# Patient Record
Sex: FEMALE | Race: WHITE | Employment: OTHER | URBAN - METROPOLITAN AREA
[De-identification: names, ages, dates, MRNs, and addresses within clinical notes are randomized per-mention and may not be internally consistent; named-entity substitution may affect disease eponyms.]

---

## 2018-06-06 ENCOUNTER — RECORDS - HEALTHEAST (OUTPATIENT)
Dept: LAB | Facility: CLINIC | Age: 83
End: 2018-06-06

## 2018-06-06 LAB
ANION GAP SERPL CALCULATED.3IONS-SCNC: 6 MMOL/L (ref 5–18)
BUN SERPL-MCNC: 21 MG/DL (ref 8–28)
CALCIUM SERPL-MCNC: 9.2 MG/DL (ref 8.5–10.5)
CHLORIDE BLD-SCNC: 113 MMOL/L (ref 98–107)
CO2 SERPL-SCNC: 22 MMOL/L (ref 22–31)
CREAT SERPL-MCNC: 1.05 MG/DL (ref 0.6–1.1)
ERYTHROCYTE [DISTWIDTH] IN BLOOD BY AUTOMATED COUNT: 13.4 % (ref 11–14.5)
GFR SERPL CREATININE-BSD FRML MDRD: 49 ML/MIN/1.73M2
GLUCOSE BLD-MCNC: 118 MG/DL (ref 70–125)
HCT VFR BLD AUTO: 37.2 % (ref 35–47)
HGB BLD-MCNC: 11.5 G/DL (ref 12–16)
MCH RBC QN AUTO: 30.9 PG (ref 27–34)
MCHC RBC AUTO-ENTMCNC: 30.9 G/DL (ref 32–36)
MCV RBC AUTO: 100 FL (ref 80–100)
PLATELET # BLD AUTO: 248 THOU/UL (ref 140–440)
PMV BLD AUTO: 9.5 FL (ref 8.5–12.5)
POTASSIUM BLD-SCNC: 3.7 MMOL/L (ref 3.5–5)
RBC # BLD AUTO: 3.72 MILL/UL (ref 3.8–5.4)
SODIUM SERPL-SCNC: 141 MMOL/L (ref 136–145)
WBC: 5.3 THOU/UL (ref 4–11)

## 2018-09-04 ENCOUNTER — APPOINTMENT (OUTPATIENT)
Dept: ULTRASOUND IMAGING | Facility: CLINIC | Age: 83
DRG: 394 | End: 2018-09-04
Attending: EMERGENCY MEDICINE
Payer: MEDICARE

## 2018-09-04 ENCOUNTER — HOSPITAL ENCOUNTER (INPATIENT)
Facility: CLINIC | Age: 83
LOS: 5 days | Discharge: NURSING, REHAB OR LONG-TERM CARE | DRG: 394 | End: 2018-09-10
Attending: EMERGENCY MEDICINE | Admitting: HOSPITALIST
Payer: MEDICARE

## 2018-09-04 DIAGNOSIS — N30.00 ACUTE CYSTITIS WITHOUT HEMATURIA: Primary | ICD-10-CM

## 2018-09-04 DIAGNOSIS — R19.7 DIARRHEA, UNSPECIFIED TYPE: ICD-10-CM

## 2018-09-04 DIAGNOSIS — A04.72 CLOSTRIDIUM DIFFICILE DIARRHEA: ICD-10-CM

## 2018-09-04 DIAGNOSIS — N17.9 ACUTE KIDNEY INJURY (H): ICD-10-CM

## 2018-09-04 LAB
ALBUMIN SERPL-MCNC: 3.9 G/DL (ref 3.4–5)
ALBUMIN UR-MCNC: 100 MG/DL
ALP SERPL-CCNC: 122 U/L (ref 40–150)
ALT SERPL W P-5'-P-CCNC: 17 U/L (ref 0–50)
ANION GAP SERPL CALCULATED.3IONS-SCNC: 16 MMOL/L (ref 3–14)
ANION GAP SERPL CALCULATED.3IONS-SCNC: 18 MMOL/L (ref 3–14)
APPEARANCE UR: ABNORMAL
AST SERPL W P-5'-P-CCNC: 20 U/L (ref 0–45)
BACTERIA #/AREA URNS HPF: ABNORMAL /HPF
BASOPHILS # BLD AUTO: 0 10E9/L (ref 0–0.2)
BASOPHILS NFR BLD AUTO: 0.3 %
BILIRUB SERPL-MCNC: 0.4 MG/DL (ref 0.2–1.3)
BILIRUB UR QL STRIP: NEGATIVE
BUN SERPL-MCNC: 88 MG/DL (ref 7–30)
BUN SERPL-MCNC: 92 MG/DL (ref 7–30)
C DIFF TOX B STL QL: NEGATIVE
CALCIUM SERPL-MCNC: 7.1 MG/DL (ref 8.5–10.1)
CALCIUM SERPL-MCNC: 8 MG/DL (ref 8.5–10.1)
CHLORIDE SERPL-SCNC: 101 MMOL/L (ref 94–109)
CHLORIDE SERPL-SCNC: 93 MMOL/L (ref 94–109)
CHLORIDE UR-SCNC: 21 MMOL/L
CO2 SERPL-SCNC: 16 MMOL/L (ref 20–32)
CO2 SERPL-SCNC: 16 MMOL/L (ref 20–32)
COLOR UR AUTO: YELLOW
CREAT SERPL-MCNC: 3.4 MG/DL (ref 0.52–1.04)
CREAT SERPL-MCNC: 3.98 MG/DL (ref 0.52–1.04)
DIFFERENTIAL METHOD BLD: NORMAL
EOSINOPHIL # BLD AUTO: 0 10E9/L (ref 0–0.7)
EOSINOPHIL NFR BLD AUTO: 0.3 %
ERYTHROCYTE [DISTWIDTH] IN BLOOD BY AUTOMATED COUNT: 13.6 % (ref 10–15)
GFR SERPL CREATININE-BSD FRML MDRD: 10 ML/MIN/1.7M2
GFR SERPL CREATININE-BSD FRML MDRD: 13 ML/MIN/1.7M2
GLUCOSE SERPL-MCNC: 88 MG/DL (ref 70–99)
GLUCOSE SERPL-MCNC: 93 MG/DL (ref 70–99)
GLUCOSE UR STRIP-MCNC: NEGATIVE MG/DL
HCT VFR BLD AUTO: 40.6 % (ref 35–47)
HGB BLD-MCNC: 14 G/DL (ref 11.7–15.7)
HGB UR QL STRIP: ABNORMAL
IMM GRANULOCYTES # BLD: 0 10E9/L (ref 0–0.4)
IMM GRANULOCYTES NFR BLD: 0.3 %
KETONES UR STRIP-MCNC: 10 MG/DL
LACTATE BLD-SCNC: 1.2 MMOL/L (ref 0.7–2)
LEUKOCYTE ESTERASE UR QL STRIP: ABNORMAL
LYMPHOCYTES # BLD AUTO: 1.5 10E9/L (ref 0.8–5.3)
LYMPHOCYTES NFR BLD AUTO: 24.5 %
MCH RBC QN AUTO: 31.2 PG (ref 26.5–33)
MCHC RBC AUTO-ENTMCNC: 34.5 G/DL (ref 31.5–36.5)
MCV RBC AUTO: 90 FL (ref 78–100)
MONOCYTES # BLD AUTO: 0.5 10E9/L (ref 0–1.3)
MONOCYTES NFR BLD AUTO: 8 %
MUCOUS THREADS #/AREA URNS LPF: PRESENT /LPF
NEUTROPHILS # BLD AUTO: 4 10E9/L (ref 1.6–8.3)
NEUTROPHILS NFR BLD AUTO: 66.6 %
NITRATE UR QL: POSITIVE
NRBC # BLD AUTO: 0 10*3/UL
NRBC BLD AUTO-RTO: 0 /100
OSMOLALITY SERPL: 297 MMOL/KG (ref 280–301)
OSMOLALITY UR: 327 MMOL/KG (ref 100–1200)
PH UR STRIP: 5.5 PH (ref 5–7)
PLATELET # BLD AUTO: 229 10E9/L (ref 150–450)
POTASSIUM SERPL-SCNC: 2.9 MMOL/L (ref 3.4–5.3)
POTASSIUM SERPL-SCNC: 3.2 MMOL/L (ref 3.4–5.3)
POTASSIUM UR-SCNC: 14 MMOL/L
PROT SERPL-MCNC: 7.9 G/DL (ref 6.8–8.8)
RBC # BLD AUTO: 4.49 10E12/L (ref 3.8–5.2)
RBC #/AREA URNS AUTO: 38 /HPF (ref 0–2)
SODIUM SERPL-SCNC: 127 MMOL/L (ref 133–144)
SODIUM SERPL-SCNC: 132 MMOL/L (ref 133–144)
SODIUM UR-SCNC: 29 MMOL/L
SOURCE: ABNORMAL
SP GR UR STRIP: >1.035 (ref 1–1.03)
SPECIMEN SOURCE: NORMAL
SQUAMOUS #/AREA URNS AUTO: 28 /HPF (ref 0–1)
UROBILINOGEN UR STRIP-MCNC: 2 MG/DL (ref 0–2)
WBC # BLD AUTO: 6 10E9/L (ref 4–11)
WBC #/AREA URNS AUTO: 2907 /HPF (ref 0–5)
WBC CLUMPS #/AREA URNS HPF: PRESENT /HPF

## 2018-09-04 PROCEDURE — 96366 THER/PROPH/DIAG IV INF ADDON: CPT | Performed by: EMERGENCY MEDICINE

## 2018-09-04 PROCEDURE — 99285 EMERGENCY DEPT VISIT HI MDM: CPT | Mod: Z6 | Performed by: EMERGENCY MEDICINE

## 2018-09-04 PROCEDURE — 36415 COLL VENOUS BLD VENIPUNCTURE: CPT | Performed by: EMERGENCY MEDICINE

## 2018-09-04 PROCEDURE — 87493 C DIFF AMPLIFIED PROBE: CPT | Performed by: EMERGENCY MEDICINE

## 2018-09-04 PROCEDURE — 25000132 ZZH RX MED GY IP 250 OP 250 PS 637: Mod: GY | Performed by: HOSPITALIST

## 2018-09-04 PROCEDURE — 85025 COMPLETE CBC W/AUTO DIFF WBC: CPT | Performed by: EMERGENCY MEDICINE

## 2018-09-04 PROCEDURE — 99223 1ST HOSP IP/OBS HIGH 75: CPT | Mod: AI | Performed by: HOSPITALIST

## 2018-09-04 PROCEDURE — 25000128 H RX IP 250 OP 636: Performed by: STUDENT IN AN ORGANIZED HEALTH CARE EDUCATION/TRAINING PROGRAM

## 2018-09-04 PROCEDURE — A9270 NON-COVERED ITEM OR SERVICE: HCPCS | Mod: GY | Performed by: HOSPITALIST

## 2018-09-04 PROCEDURE — 87086 URINE CULTURE/COLONY COUNT: CPT | Performed by: HOSPITALIST

## 2018-09-04 PROCEDURE — 87040 BLOOD CULTURE FOR BACTERIA: CPT | Performed by: EMERGENCY MEDICINE

## 2018-09-04 PROCEDURE — 80053 COMPREHEN METABOLIC PANEL: CPT | Performed by: EMERGENCY MEDICINE

## 2018-09-04 PROCEDURE — 96361 HYDRATE IV INFUSION ADD-ON: CPT | Performed by: EMERGENCY MEDICINE

## 2018-09-04 PROCEDURE — 83930 ASSAY OF BLOOD OSMOLALITY: CPT | Performed by: EMERGENCY MEDICINE

## 2018-09-04 PROCEDURE — 83605 ASSAY OF LACTIC ACID: CPT | Performed by: EMERGENCY MEDICINE

## 2018-09-04 PROCEDURE — 76770 US EXAM ABDO BACK WALL COMP: CPT

## 2018-09-04 PROCEDURE — 83935 ASSAY OF URINE OSMOLALITY: CPT | Performed by: EMERGENCY MEDICINE

## 2018-09-04 PROCEDURE — 25000128 H RX IP 250 OP 636: Performed by: EMERGENCY MEDICINE

## 2018-09-04 PROCEDURE — 99285 EMERGENCY DEPT VISIT HI MDM: CPT | Mod: 25 | Performed by: EMERGENCY MEDICINE

## 2018-09-04 PROCEDURE — 82436 ASSAY OF URINE CHLORIDE: CPT | Performed by: EMERGENCY MEDICINE

## 2018-09-04 PROCEDURE — 80048 BASIC METABOLIC PNL TOTAL CA: CPT | Performed by: EMERGENCY MEDICINE

## 2018-09-04 PROCEDURE — 81003 URINALYSIS AUTO W/O SCOPE: CPT | Performed by: EMERGENCY MEDICINE

## 2018-09-04 PROCEDURE — 84300 ASSAY OF URINE SODIUM: CPT | Performed by: EMERGENCY MEDICINE

## 2018-09-04 PROCEDURE — 96365 THER/PROPH/DIAG IV INF INIT: CPT | Performed by: EMERGENCY MEDICINE

## 2018-09-04 PROCEDURE — 84133 ASSAY OF URINE POTASSIUM: CPT | Performed by: EMERGENCY MEDICINE

## 2018-09-04 PROCEDURE — 96367 TX/PROPH/DG ADDL SEQ IV INF: CPT | Performed by: EMERGENCY MEDICINE

## 2018-09-04 RX ORDER — SODIUM CHLORIDE 9 MG/ML
INJECTION, SOLUTION INTRAVENOUS CONTINUOUS
Status: DISCONTINUED | OUTPATIENT
Start: 2018-09-04 | End: 2018-09-04

## 2018-09-04 RX ORDER — SODIUM CHLORIDE AND POTASSIUM CHLORIDE 150; 900 MG/100ML; MG/100ML
INJECTION, SOLUTION INTRAVENOUS CONTINUOUS
Status: DISCONTINUED | OUTPATIENT
Start: 2018-09-04 | End: 2018-09-05

## 2018-09-04 RX ORDER — POTASSIUM CHLORIDE 750 MG/1
30 TABLET, EXTENDED RELEASE ORAL ONCE
Status: COMPLETED | OUTPATIENT
Start: 2018-09-04 | End: 2018-09-04

## 2018-09-04 RX ORDER — POTASSIUM CHLORIDE 7.45 MG/ML
10 INJECTION INTRAVENOUS
Status: DISPENSED | OUTPATIENT
Start: 2018-09-05 | End: 2018-09-05

## 2018-09-04 RX ORDER — ASPIRIN 81 MG/1
81 TABLET ORAL DAILY
COMMUNITY

## 2018-09-04 RX ORDER — POTASSIUM CL/LIDO/0.9 % NACL 10MEQ/0.1L
10 INTRAVENOUS SOLUTION, PIGGYBACK (ML) INTRAVENOUS
Status: DISCONTINUED | OUTPATIENT
Start: 2018-09-05 | End: 2018-09-04 | Stop reason: ALTCHOICE

## 2018-09-04 RX ORDER — TRIAMCINOLONE ACETONIDE 1 MG/G
CREAM TOPICAL 2 TIMES DAILY
COMMUNITY

## 2018-09-04 RX ORDER — SODIUM CHLORIDE 9 MG/ML
1000 INJECTION, SOLUTION INTRAVENOUS CONTINUOUS
Status: DISCONTINUED | OUTPATIENT
Start: 2018-09-04 | End: 2018-09-05

## 2018-09-04 RX ORDER — METOPROLOL SUCCINATE 100 MG/1
100 TABLET, EXTENDED RELEASE ORAL DAILY
COMMUNITY

## 2018-09-04 RX ORDER — ACETAMINOPHEN 500 MG
1000 TABLET ORAL EVERY 8 HOURS PRN
COMMUNITY

## 2018-09-04 RX ORDER — IBUPROFEN 800 MG
1 TABLET ORAL DAILY
COMMUNITY

## 2018-09-04 RX ORDER — LOPERAMIDE HCL 2 MG
2 CAPSULE ORAL 3 TIMES DAILY PRN
Status: ON HOLD | COMMUNITY
End: 2018-09-07

## 2018-09-04 RX ADMIN — SODIUM CHLORIDE 1000 ML: 9 INJECTION, SOLUTION INTRAVENOUS at 19:51

## 2018-09-04 RX ADMIN — METRONIDAZOLE 500 MG: 500 INJECTION, SOLUTION INTRAVENOUS at 19:50

## 2018-09-04 RX ADMIN — SODIUM CHLORIDE: 9 INJECTION, SOLUTION INTRAVENOUS at 17:31

## 2018-09-04 RX ADMIN — POTASSIUM CHLORIDE 30 MEQ: 750 TABLET, EXTENDED RELEASE ORAL at 23:49

## 2018-09-04 RX ADMIN — SODIUM CHLORIDE 1000 ML: 900 INJECTION, SOLUTION INTRAVENOUS at 18:33

## 2018-09-04 RX ADMIN — POTASSIUM CHLORIDE AND SODIUM CHLORIDE: 900; 150 INJECTION, SOLUTION INTRAVENOUS at 23:49

## 2018-09-04 ASSESSMENT — ENCOUNTER SYMPTOMS
FEVER: 0
APPETITE CHANGE: 1
NAUSEA: 1
DIARRHEA: 1

## 2018-09-04 NOTE — IP AVS SNAPSHOT
Reena Medellin #[id number] (CSN:[id number])  (89 year old F)  (Adm: 18)     [id number]               [address] [place] [address] BANK: [phone]            Patient Demographics     Patient Name Sex          Age SSN Address Phone    Reena Medellin Female 1921 (89 year old) [id number] [place] [address] [phone] (Home)      Emergency Contact(s)     Name Relation Home Work Mobile    Maite Medellin  [phone]      Admission Information     Attending Provider Admitting Provider Admission Type Admission Date/Time    Delicia Pettit MD Khambaty, Maleka, MD Emergency 18  1647    Discharge Date Hospital Service Auth/Cert Status Service Area     Internal Medicine [facility]    Unit Room/Bed Admission Status        U 5201/5201-01 Admission (Confirmed)       Admission     Complaint    Diarrhea      Hospital Account     Name Acct ID Class Status Primary Coverage    Reena Medellin [id number] Inpatient Open MEDICARE - MEDICARE FOR HB SUPPLEMENT            Guarantor Account (for Hospital Account #[id number])     Name Relation to Pt Service Area Active? Acct Type    Reena Medellin Self FCS Yes Personal/Family    Address Phone          [address] [place] [address] [phone](H)              Coverage Information (for Hospital Account #[id number])     1. MEDICARE/MEDICARE FOR HB SUPPLEMENT     F/O Payor/Plan Precert #    MEDICARE/MEDICARE FOR HB SUPPLEMENT     Subscriber Subscriber #    Reena Medellin [id number]    Address Phone    ATTN CLAIMS  [address]  [place] [address] [phone]          2. [employer]EDOM PLAN     F/O Payor/Plan Precert #    [employer] FREEDOM PLAN     Subscriber Subscriber #    Reena Medellin [id number]    Address Phone    [address]  [place] [address] [phone]                                                      INTERAGENCY  TRANSFER FORM - PHYSICIAN ORDERS   9/4/2018                       [facility] BANK: [phone]            Attending Provider: Delicia Pettit MD     Allergies:  Sulfa Drugs    Infection:  None   Service:  INTERNAL MED    Ht:  --   Wt:  68 kg (150 lb)   Admission Wt:  68 kg (150 lb)    BMI:  --   BSA:  --            ED Clinical Impression     Diagnosis Description Comment Added By Time Added    Clostridium difficile diarrhea [A04.72] Clostridium difficile diarrhea [A04.72] Suspected Bryce Jackson,  9/4/2018  6:29 PM    Diarrhea, unspecified type [R19.7] Diarrhea, unspecified type [R19.7]  Bryce Jackson,  9/4/2018  6:31 PM    Acute kidney injury (H) [N17.9] Acute kidney injury (H) [N17.9]  Bryce Jackson,  9/4/2018  6:31 PM      Hospital Problems as of 9/10/2018              Priority Class Noted POA    Diarrhea Medium  9/5/2018 Yes      Non-Hospital Problems as of 9/10/2018     None      Code Status History     Date Active Date Inactive Code Status Order ID Comments User Context    9/7/2018  9:29 AM  DNR/DNI [id number]  Myra Valle MD Outpatient    9/5/2018  2:14 PM 9/7/2018  9:29 AM DNR/DNI [id number]  Myra Valle MD Inpatient    9/5/2018  1:34 AM 9/5/2018  2:14 PM Full Code [id number]  Karli Ryan Inpatient      Current Code Status     Date Active Code Status Order ID Comments User Context       Prior      Summary of Visit     Reason for your hospital stay       You were admitted to the hospital a urinary tract infection and diarrhea. You were treated with IV antibiotics for the urinary tract infection. Your dose of loperamide was also increased to help with your diarrhea, which is likely due to your recent use of antibiotics.                Medication Review      START taking        Dose / Directions Comments    * bismuth subsalicylate 262 MG Tabs   Used for:  Diarrhea, unspecified type        Dose:  3 tablet   Take 3 tablets by mouth  3 times daily   Quantity:  270 tablet   Refills:  1        * bismuth subsalicylate 262 MG chewable tablet   Commonly known as:  PEPTO BISMOL   Used for:  Diarrhea, unspecified type        Dose:  524 mg   Take 2 tablets (524 mg) by mouth 4 times daily (before meals and nightly)   Quantity:  56 tablet   Refills:  1        * Notice:  This list has 2 medication(s) that are the same as other medications prescribed for you. Read the directions carefully, and ask your doctor or other care provider to review them with you.      CONTINUE these medications which may have CHANGED, or have new prescriptions. If we are uncertain of the size of tablets/capsules you have at home, strength may be listed as something that might have changed.        Dose / Directions Comments    loperamide 2 MG capsule   Commonly known as:  IMODIUM   This may have changed:    - when to take this  - reasons to take this   Used for:  Diarrhea, unspecified type        Dose:  2 mg   Take 1 capsule (2 mg) by mouth 2 times daily as needed for diarrhea (titrate to 1-2 stools daily)   Quantity:  30 capsule   Refills:  1          CONTINUE these medications which have NOT CHANGED        Dose / Directions Comments    acetaminophen 500 MG tablet   Commonly known as:  TYLENOL        Dose:  1000 mg   Take 1,000 mg by mouth every 8 hours as needed for mild pain   Refills:  0        aspirin 81 MG EC tablet        Dose:  81 mg   Take 81 mg by mouth daily   Refills:  0        CALAZIME SKIN PROTECTANT EX        Dose:  1 g   Externally apply 1 g topically as needed   Refills:  0        melatonin 5 MG tablet        Dose:  5 mg   Take 5 mg by mouth nightly as needed for sleep   Refills:  0        metoprolol succinate 100 MG 24 hr tablet   Commonly known as:  TOPROL-XL        Dose:  100 mg   Take 100 mg by mouth daily   Refills:  0        MULTIVITAMIN ADULT PO        Dose:  1 tablet   Take 1 tablet by mouth daily   Refills:  0        triamcinolone 0.1 % cream   Commonly  known as:  KENALOG        Apply topically 2 times daily TO AFFECTED AREAS OF LEG FOR 3-4 WEEKS FOR ATOPIC DERMATITIS   Refills:  0        Vitamin D3 400 units Caps        Dose:  1 capsule   Take 1 capsule by mouth daily   Refills:  0                After Care     Activity - Up ad coco           Advance Diet as Tolerated       Follow this diet upon discharge: Regular       Encourage PO fluids           General info for SNF       Length of Stay Estimate: Long Term Care  Condition at Discharge: Improving  Level of care:board and care  Rehabilitation Potential: Good  Admission H&P remains valid and up-to-date: Yes  Recent Chemotherapy: N/A  Use Nursing Home Standing Orders: Yes       Mantoux instructions       Give two-step Mantoux (PPD) Per Facility Policy Yes             Referrals     Home Care OT Referral for Hospital Discharge       [facility]  Ph: [phone]  Fax: [phone]    OT to eval and treat    Your provider has ordered home care - occupational therapy. If you have not been contacted within 2 days of your discharge please call the department phone number listed on the top of this document.       Home Care PT Referral for Hospital Discharge       [facility]  Ph: [phone]  Fax: [phone]    PT to eval and treat    Your provider has ordered home care - physical therapy. If you have not been contacted within 2 days of your discharge please call the department phone number listed on the top of this document.       Home care nursing referral       [facility]  Ph: [phone]  Fax: [phone]    RN skilled nursing visit. RN to assess vital signs and weight, respiratory and cardiac status, pain level and activity tolerance, hydration, nutrition and bowel status and home safety.  RN to teach medication management.  Home health aide to assist with ADLS    Your provider has ordered home care nursing services. If you have not been contacted within 2 days of your discharge please call  the inpatient department phone number at [phone] .       Physical Therapy Adult Consult       Evaluate and treat as clinically indicated.    Reason:  Deconditioning in the setting of ongoing urinary tract infection             Follow-Up Appointment Instructions     Follow Up and recommended labs and tests       - Follow up with primary care provider early next week to ensure adequate treatment of cystitis and diarrhea. The following labs/tests are recommended: Basic metabolic panel.             Statement of Approval     Ordered          09/10/18 1006  I have reviewed and agree with all the recommendations and orders detailed in this document.  EFFECTIVE NOW     Approved and electronically signed by:  Delicia Pettit MD                                                 INTERAGENCY TRANSFER FORM - NURSING   9/4/2018                       [address] [facility] BANK: [phone]            Attending Provider: Delicia Pettit MD     Allergies:  Sulfa Drugs    Infection:  None   Service:  INTERNAL MED    Ht:  --   Wt:  68 kg (150 lb)   Admission Wt:  68 kg (150 lb)    BMI:  --   BSA:  --            Advance Directives        Scanned docmt in ACP Activity?           No scanned doc        Immunizations     None      ASSESSMENT     Discharge Profile Flowsheet     EXPECTED DISCHARGE     SKIN      Expected Discharge Date  09/10/18 (G[facility]ERTO) 09/10/18 0846   Inspection of bony prominences  Full 09/10/18 1054    DISCHARGE NEEDS ASSESSMENT     Full except areas not inspected   Hip, left;Hip, right;Buttock, left;Buttock, right;Sacrum;Coccyx;Knee, left;Knee, right;Ankle, left;Ankle, right;Heel, left;Heel, right;Foot, left;Foot, right 09/07/18 0525    Equipment Currently Used at Home  grab bar;raised toilet;shower chair;walker, rolling 09/07/18 1618   Inspection under devices  Full 09/10/18 1054    Transportation Available  family or friend will provide 09/07/18 1618   Skin WDL  ex 09/10/18 1054     "GASTROINTESTINAL (ADULT,PEDIATRIC,OB)     Skin Color/Characteristics  bruised (ecchymotic) 09/10/18 1054    GI WDL  ex 09/10/18 1054   Skin Temperature  warm 09/10/18 1054    Last Bowel Movement  09/09/18 09/09/18 2038   Skin Moisture  dry 09/10/18 1054    Passing flatus  yes 09/09/18 1102   Skin Elasticity  quick return to original state 09/09/18 1102    COMMUNICATION ASSESSMENT     Skin Integrity  bruise(s);scab(s);scar(s);drain/device(s) 09/10/18 1054    Patient's communication style  spoken language (English or Bilingual) 09/05/18 0126   SAFETY      FINAL RESOURCES     Safety WDL  WDL 09/10/18 1054    Resources List  Home Care 09/05/18 1109   All Alarms  alarm(s) activated and audible 09/09/18 2038    Home Care  Other (Comment) ([facility]) 09/05/18 1109                      Assessment WDL (Within Defined Limits) Definitions           Safety WDL     Effective: 09/28/15    Row Information: <b>WDL Definition:</b> Bed in low position, wheels locked; call light in reach; upper side rails up x 2; ID band on<br> <font color=\"gray\"><i>Item=AS safety wdl>>List=AS safety wdl>>Version=F14</i></font>      Skin WDL     Effective: 09/28/15    Row Information: <b>WDL Definition:</b> Warm; dry; intact; elastic; without discoloration; pressure points without redness<br> <font color=\"gray\"><i>Item=AS skin wdl>>List=AS skin wdl>>Version=F14</i></font>      Vitals     Vital Signs Flowsheet     VITAL SIGNS     Functioning  can do everything I need to 09/07/18 0805    Temp  98.3  F (36.8  C) 09/10/18 0440   Sleep  normal sleep 09/07/18 0805    Temp src  Oral 09/10/18 0440   HEIGHT AND WEIGHT      Resp  16 09/10/18 0900   Weight  68 kg (150 lb) 09/05/18 0427    Pulse  81 09/10/18 0900   Weight Method  Standing scale 09/05/18 0427    Pulse/Heart Rate Source  Monitor 09/10/18 0900   GIL[facility]ERTO COMA SCALE      BP  167/81 09/10/18 0900   Best Eye Response  4-->(E4) spontaneous 09/10/18 1054    BP Location  Right arm 09/10/18 0900   " Best Motor Response  6-->(M6) obeys commands 09/10/18 1054    OXYGEN THERAPY     Best Verbal Response  5-->(V5) oriented 09/10/18 1054    SpO2  100 % 09/10/18 0900   [place] Coma Scale Score  15 09/10/18 1054    O2 Device  None (Room air) 09/10/18 0900   POSITIONING      PAIN/COMFORT     Body Position  independently positioning 09/09/18 2038    Patient Currently in Pain  denies 09/10/18 0914   Head of Bed (HOB)  HOB at 30-45 degrees 09/09/18 1102    Preferred Pain Scale  CAPA (Clinically Aligned Pain Assessment) ([facility] Adults Only) 09/10/18 0914   DAILY CARE      CLINICALLY ALIGNED PAIN ASSESSMENT (CAPA) ([facility] ADULTS ONLY)     Activity Management  activity adjusted per tolerance 09/09/18 2038    Comfort  negligible pain 09/07/18 0805   Activity Assistance Provided  assistance, 1 person 09/09/18 2038    Change in Pain  about the same 09/07/18 0805   Assistive Device Utilized  walker 09/09/18 2038    Pain Control  partially effective 09/07/18 0805                 Patient Lines/Drains/Airways Status    Active LINES/DRAINS/AIRWAYS     Name: Placement date: Placement time: Site: Days: Last dressing change:    Peripheral IV 09/07/18 Right;Anterior Lower forearm 09/07/18   0825   Lower forearm   3             Patient Lines/Drains/Airways Status    Active PICC/CVC     None            Intake/Output Detail Report     Date Intake     Output     Net    Shift P.O. I.V. IV Piggyback Total Urine Other Stool Total       Day 09/09/18 0000 - 09/09/18 0659 -- -- -- -- 450 -- -- 450 -450    Colette 09/09/18 0700 - 09/09/18 1459 300 -- -- 300 400 -- -- 400 -100    Noc 09/09/18 1500 - 09/09/18 2359 480 -- -- 480 250 200 -- 450 30    Day 09/10/18 0000 - 09/10/18 0659 -- -- -- -- 100 -- -- 100 -100    Colette 09/10/18 0700 - 09/10/18 1459 -- -- -- -- 100 -- -- 100 -100      Last Void/BM       Most Recent Value    Urine Occurrence 1 at 09/09/2018 0700    Stool Occurrence 1 at 09/09/2018 0700      Case  Management/Discharge Planning     Case Management/Discharge Planning Flowsheet     LIVING ENVIRONMENT     Home Care  Other (Comment) ([facility]) 09/05/18 1109    Lives With  facility resident 09/07/18 1618   MH/BH CAREGIVER      Living Arrangements  assisted living 09/07/18 1618   Filed Complexity Screen Score  5 09/05/18 0827    COPING/STRESS     ABUSE RISK SCREEN      Major Change/Loss/Stressor  denies 09/05/18 0132   QUESTION TO PATIENT:  Has a member of your family or a partner(now or in the past) intimidated, hurt, manipulated, or controlled you in any way?  no 09/05/18 0130    EXPECTED DISCHARGE     QUESTION TO PATIENT: Do you feel safe going back to the place where you are living?  yes 09/05/18 0130    Expected Discharge Date  09/10/18 ([facility]) 09/10/18 0846   OBSERVATION: Is there reason to believe there has been maltreatment of a vulnerable adult (ie. Physical/Sexual/Emotional abuse, self neglect, lack of adequate food, shelter, medical care, or financial exploitation)?  no 09/05/18 0130    DISCHARGE PLANNING     OTHER      Transportation Available  family or friend will provide 09/07/18 1618   Are you depressed or being treated for depression?  No 09/05/18 0132    FINAL RESOURCES     HOMICIDE RISK      Equipment Currently Used at Home  grab bar;raised toilet;shower chair;walker, rolling 09/07/18 1618   Feels Like Hurting Others  no 09/04/18 1730    Resources List  Home Care 09/05/18 1109                       [address] [facility] BANK: [phone]            Medication Administration Report for Reena Medellin as of 09/10/18 1120   Legend:    Given Hold Not Given Due Canceled Entry Other Actions    Time Time (Time) Time  Time-Action       Inactive    Active    Linked        Medications 09/04/18 09/05/18 09/06/18 09/07/18 09/08/18 09/09/18 09/10/18    acetaminophen (TYLENOL) tablet 1,000 mg  Dose: 1,000 mg  Freq: EVERY 8 HOURS PRN Route: PO  PRN Reason: mild pain  Start: 09/05/18 0144   Admin Instructions:  Maximum acetaminophen dose from all sources = 75 mg/kg/day not to exceed 4 gram    Admin. Amount: 2 tablet (2 × 500 mg tablet)  Dispense Loc: [facility] ADS 5A1               aspirin EC tablet 81 mg  Dose: 81 mg  Freq: DAILY Route: PO  Start: 09/05/18 0800   Admin Instructions: DO NOT CRUSH.    Admin. Amount: 1 tablet (1 × 81 mg tablet)  Last Admin: 09/10/18 0848  Dispense Loc: [facility] ADS 5A1      0754 (81 mg)-Given        0958 (81 mg)-Given        0804 (81 mg)-Given        0839 (81 mg)-Given        1007 (81 mg)-Given        0848 (81 mg)-Given           bismuth subsalicylate (PEPTO BISMOL) chewable tablet 524 mg  Dose: 524 mg  Freq: 4 TIMES DAILY BEFORE MEALS & NIGHTLY Route: PO  Start: 09/07/18 0900   Admin. Amount: 2 tablet (2 × 262 mg tablet)  Last Admin: 09/08/18 1655  Dispense Loc: [facility]        (1112)-Not Given       1342 (524 mg)-Given       (1801)-Not Given       (2100)-Not Given        (0842)-Not Given       (1055)-Not Given       1655 (524 mg)-Given       (2134)-Not Given        (0800)-Not Given       (1006)-Not Given       (1633)-Not Given       (2130)-Not Given        (0849)-Not Given       (1035)-Not Given       [ ] 1630       [ ] 2200           cholecalciferol (vitamin D3) tablet 400 Units  Dose: 400 Units  Freq: DAILY Route: PO  Start: 09/05/18 0800   Admin. Amount: 1 tablet (1 × 400 Units tablet)  Last Admin: 09/10/18 0848  Dispense Loc: [facility] ADS 5A1      1240 (400 Units)-Given        0958 (400 Units)-Given        0803 (400 Units)-Given        0838 (400 Units)-Given        1007 (400 Units)-Given        0848 (400 Units)-Given           loperamide (IMODIUM) capsule 2 mg  Dose: 2 mg  Freq: 3 TIMES DAILY PRN Route: PO  PRN Reason: diarrhea  Start: 09/05/18 0145   Admin. Amount: 1 capsule (1 × 2 mg capsule)  Last Admin: 09/06/18 2305  Dispense Loc: [facility] ADS 5A1       2305 (2 mg)-Given               loperamide (IMODIUM) capsule 4 mg  Dose: 4 mg  Freq: EVERY MORNING Route: PO  Start:  09/07/18 0800   Admin. Amount: 2 capsule (2 × 2 mg capsule)  Last Admin: 09/10/18 0848  Dispense Loc: [facility] ADS 5A1        0804 (4 mg)-Given        0839 (4 mg)-Given        1007 (4 mg)-Given        0848 (4 mg)-Given           loperamide (IMODIUM) capsule 4 mg  Dose: 4 mg  Freq: EVERY EVENING Route: PO  Start: 09/06/18 2000   Admin. Amount: 2 capsule (2 × 2 mg capsule)  Last Admin: 09/08/18 1934  Dispense Loc: [facility] ADS 5A1       2007 (4 mg)-Given        (2007)-Not Given        1934 (4 mg)-Given        (2130)-Not Given        [ ] 2000           melatonin tablet 5 mg  Dose: 5 mg  Freq: AT BEDTIME PRN Route: PO  PRN Reason: sleep  Start: 09/05/18 0145   Admin. Amount: 1 tablet (1 × 5 mg tablet)  Last Admin: 09/09/18 2131  Dispense Loc: [facility] ADS 5A1          2131 (5 mg)-Given            menthol-zinc oxide (CALMOSEPTINE) 0.44-20.625 % ointment  Freq: 2 TIMES DAILY PRN Route: Top  PRN Reason: skin protection  Start: 09/06/18 1745   Admin Instructions: Apply to perianal area for skin breakdown    Dispense Loc: [facility]               naloxone (NARCAN) injection 0.1-0.4 mg  Dose: 0.1-0.4 mg  Freq: EVERY 2 MIN PRN Route: IV  PRN Reason: opioid reversal  Start: 09/05/18 0035   Admin Instructions: For respiratory rate LESS than or EQUAL to 8.  Partial reversal dose:  0.1 mg titrated q 2 minutes for Analgesia Side Effects Monitoring Sedation Level of 3 (frequently drowsy, arousable, drifts to sleep during conversation).Full reversal dose:  0.4 mg bolus for Analgesia Side Effects Monitoring Sedation Level of 4 (somnolent, minimal or no response to stimulation).  For ordered doses up to 2mg give IVP. Give each 0.4mg over 15 seconds in emergency situations. For non-emergent situations further dilute in 9mL of NS to facilitate titration of response.    Admin. Amount: 0.1-0.4 mg = 0.25-1 mL Conc: 0.4 mg/mL  Dispense Loc: [facility] ADS 5A1  Volume: 1 mL               potassium chloride SA (K-DUR/KLOR-CON M) CR tablet  20 mEq  Dose: 20 mEq  Freq: DAILY Route: PO  Start: 09/05/18 0800   Admin Instructions: DO NOT CRUSH.    Admin. Amount: 2 tablet (2 × 10 mEq tablet)  Last Admin: 09/10/18 0848  Dispense Loc: [facility] ADS 5A1      0754 (20 mEq)-Given        0958 (20 mEq)-Given        0802 (20 mEq)-Given        0840 (20 mEq)-Given        1007 (20 mEq)-Given        0848 (20 mEq)-Given          Discontinued Medications  Medications 09/04/18 09/05/18 09/06/18 09/07/18 09/08/18 09/09/18 09/10/18         Dose: 1 g  Freq: EVERY 24 HOURS Route: IV  Indications of Use: URINARY TRACT INFECTION  Indications Comment: Recurrent UTI.  Recalcitrant to Cipro, Levaquin/Amoxicillin, and Cefdinir  Last Dose: 0 g (09/07/18 0808)  Start: 09/05/18 0700   End: 09/08/18 0636   Admin Instructions: Dose adjusted per renal dosing policy.  Estimated CrCl = 10-29 mL/min. <br>    Admin. Amount: 1 g  Last Admin: 09/07/18 0906  Dispense Loc: [facility]      0854 (1 g)-New Bag        0629 (1 g)-New Bag        0803 (1 g)-New Bag       0808-Stopped [C]       0906 (1 g)-Restarted        (0631)-Not Given [C]       0636-Med Discontinued                  INTERAGENCY TRANSFER FORM - NOTES (H&P, Discharge Summary, Consults, Procedures, Therapies)   9/4/2018                       UNIT [address] [facility] BANK: [phone]               History & Physicals      H&P by Karli Ryan at 9/4/2018 10:59 PM     Author:  Karli Ryan Service:  General Medicine Author Type:  Resident    Filed:  9/5/2018  3:30 AM Date of Service:  9/4/2018 10:59 PM Creation Time:  9/4/2018  9:52 PM    Status:  Attested :  Karli Ryan (Resident)    Cosigner:  Andressa Benitez MD at 9/5/2018 11:04 AM        Attestation signed by Andressa Benitez MD at 9/5/2018 11:04 AM        Physician Attestation         I, Andressa Benitez, personally examined and evaluated this patient.  I discussed the patient with the medical student and/or resident and care team, and agree with the assessment and  plan of care as documented in the note of September 4, 2018 [date].       I personally reviewed vital signs, medications, labs and imaging.  Family history was reviewed and is not pertinent to current presentation.      Moura findings: Ms. Medellin is a 89 year old lady with prior medical history of GERD, osteoarthritis, solitary L kidney (renal cancer s/p right nephrectomy), HTN, CKD 3, intraductal papillary mucinous neoplasm presenting from [facility] with weakness, dizziness, hypotension, diarrhea and nausea.  The patient has been on multiple rounds of antibiotics, most recently Levofloxacin, for UTI's.  Given her current presentation concern was for C diff colitis.  This was obtained in the emergency room and patient was started on empiric Flagyl while in the ED.  (Addendum C diff was negative and Flagyl was not continued). Her hypotension has responded to fluid resuscitation.  She also has hyponatremia which we will workup with serum osm, urine osm, and urine sodium.  However given her clinical history this may be hypovolemic.  She has a significant MEME - we will recheck creatinine after fluid administration.  We can hold home metoprolol given her current low blood pressures.      Andressa Benitez MD      Date of Service (when I saw the patient): 09/04/18                               [facility] [place]    Internal Medicine History and Physical - [facility] Service       Date of Admission:  9/4/2018    Chief Complaint[MG1.1]   Urinary urgency, frequency, retention    History is obtained from the patient and electronic health record[MG1.2]    History of Present Illness   Reena Medellin is a 89 year old female[MG1.1] with a PMHx significant for osteoporosis, CKD, HTN, and renal cancer s/p right nephrectomy, who presents with complaints of urinary retention and frequency.    For the past 1.5 months, patient has been experiencing urinary frequency, urgency, and feeling of urinary retention.  " No associated flank pain, fevers, or dysuria.  However, has noticed some pressure in the lower abdomen.  Patient has been evaluated for these symptoms 3 times since onset.  First evaluation was on 7/13, at which point she was diagnosed with a UTI and prescribed Cipro.  Next, followed up in the ED on 8/6[MG1.2] after[MG1.3] a fall[MG1.2] at home where her \"knees gave out\"[MG1.3].  Once again noted to have a UTI, and was prescribed cefdinir.  More recently, on 8/14, started on Levaquin and amoxicillin for continued cystitis.    In addition to the aforementioned symptoms, patient has had 4-5 days of nonbloody diarrhea, which has tapered off the past 2 days.  Patient denies any nausea, vomiting, sick contacts, new foods, or recent travel.  She does note having a decreased appetite over the past few months.  Earlier today, patient presented to clinic for evaluation of her diarrhea.  She was found to be hypotensive with blood pressure around 70/40.  Out of concern for her continued urinary symptoms and diarrhea in the setting of hypotension, patient transferred to the ED for evaluation.    Currently, patient complains of fullness in her lower abdomen as well as needing to urinate despite going to the bathroom recently.  She denies any recent sexual intercourse, new perineal soaps/cleansers, or difficulties with wiping her perineum post void/BMs.       Per chart review, patient fell in early August secondary to weakness in her bilateral knees leading[MG1.2] to head trauma with a laceration to the scalp[MG1.3].  She was evaluated in the ED.  CT scan of the head and cervical spine were unremarkable.  Patient denies any recent falls since.  No current lightheadedness, vision changes, or headaches.   Patient uses a walker[MG1.2].[MG1.3]    Review of Systems[MG1.1]   The 10 point Review of Systems is negative other than noted in the HPI or here.[MG1.2]     Past Medical History[MG1.1]    Essential " HTN  Osteoporoses  Anemia  Renal Cancer[MG1.2]    Past Surgical History[MG1.1]   S/p right nephrectomy[MG1.2]    Social History[MG1.1]   Born in [facility].  Currently resides in an assisted living facility.  Quit smoking in her 40s.  Rare, social alcohol use.  No illicit drug use.[MG1.2]      Family History[MG1.1]   No family history on file.[MG1.4]    Prior to Admission Medications   Prior to Admission Medications   Prescriptions Last Dose Informant Patient Reported? Taking?   Cholecalciferol (VITAMIN D3) 400 units CAPS   Yes Yes   Sig: Take 1 capsule by mouth daily   Multiple Vitamins-Minerals (MULTIVITAMIN ADULT PO)   Yes Yes   Sig: Take 1 tablet by mouth daily   acetaminophen (TYLENOL) 500 MG tablet   Yes Yes   Sig: Take 1,000 mg by mouth every 8 hours as needed for mild pain   aspirin 81 MG EC tablet   Yes Yes   Sig: Take 81 mg by mouth daily   loperamide (IMODIUM) 2 MG capsule   Yes Yes   Sig: Take 2 mg by mouth 3 times daily as needed for diarrhea   melatonin 5 MG tablet   Yes Yes   Sig: Take 5 mg by mouth nightly as needed for sleep   metoprolol succinate (TOPROL-XL) 100 MG 24 hr tablet   Yes Yes   Sig: Take 100 mg by mouth daily   triamcinolone (KENALOG) 0.1 % cream   Yes Yes   Sig: Apply topically 2 times daily TO AFFECTED AREAS OF LEG FOR 3-4 WEEKS FOR ATOPIC DERMATITIS      Facility-Administered Medications: None     Allergies   Allergies   Allergen Reactions     Sulfa Drugs        Physical Exam   Vital Signs: Temp: 97  F (36.1  C) Temp src: Oral BP: 111/58 Pulse: 72   Resp: 18 SpO2: 97 % O2 Device: None (Room air)    Weight: 0 lbs 0 oz  General Appearance:[MG1.1] Well appearing.  Resting comfortably in bed.  No acute distress.[MG1.2]    Eyes:[MG1.1] Sclera anicteric.[MG1.2]    HEENT:[MG1.1] Crown of head, two well healed excoriations.  Slight surrounding edema but no appreciable ecchymosis.[MG1.2]    Respiratory:[MG1.1] Breathing comfortably on room air.  Lungs CTAB.[MG1.2]  Cardiovascular:[MG1.1] Heart  RRR.  No murmur, gallops, or rubs.[MG1.2]  GI:[MG1.1] Soft, non-distended.  Minimal suprapubic tenderness.  No rebound or guarding.  Normoactive BS.[MG1.2]  Lymph/Hematologic:[MG1.1] No bruising noted.[MG1.2]  Genitourinary:[MG1.1] Deferred.  400 ml PVR.[MG1.2]    Skin:[MG1.1] Small ulceration distal portion of right 4th toe.[MG1.2]    Musculoskeletal:[MG1.1] No bilateral calf tenderness.  No CVA tenderness bilaterally.[MG1.2]  Slight bilateral patellar effusion without overlying erythema, warmth, or tenderness.    Neurolo[MG1.3]gic:[MG1.1] Moving all extremities.  A&Ox3.[MG1.2]    Psychiatric:[MG1.1] Normal mood and affect.[MG1.2]    Assessment & Plan   Reena Medellin is a 89 year old female[MG1.1] with a PMHx significant for osteoporosis, CKD, HTN, and renal cancer s/p right nephrectomy, who was admitted on 9/4 for ongoing cystitis in the setting of acute kidney injury with electrolyte abnormalities.[MG1.2]       # Urinary Retention/ Urgency/ Frequency[MG1.1]  Ongoing symptoms for the past 1.5 months.  Been evaluated 3 times previously for symptoms with treatment including Cipro, Cefdinir, and more recently Levaquin/amoxicillin on 8/14.  UA today consistent with UTI with UC pending.[MG1.2]  No clinical signs of pyelonephritis:  CVA tenderness, fever, flank pain.  Was hypotensive but lactic acid wnl.[MG1.3]  Based on clinical history, suspect a chronic cystitis with potential resistant organisms versus underlying kidney stone seeding recurrent infection versus difficulties with relaxation of urethral sphincter.  Obtained US of the[MG1.2] left[MG1.3] kidney (s/p right nephrectomy 2/2 to renal cancer) to evaluate for hydronephrosis, which was negative.  Straight cath PVR was 400 ml.  May consider ashton if retention becomes driving factor.  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       - Antibiotics: Vancomycin/ Cefepime.[MG1.3]           # Acute Kidney Injury[MG1.1]  # Hypotension  # High Anion Gap Metabolic Acidosis[MG1.3]  Cr 3.98 upon presentation.  Hx of CKD.  However, baseline appears to be around 0.9-1.1.  Suspect related to hypoperfusion given recent diarrhea[MG1.2],[MG1.3] decreased oral intake[MG1.2], and hypotension at clinic[MG1.3].  Cautious regarding fluid resuscitation in the setting of hyponatremia and potential SIADH.[MG1.2]  Will give fluids judiciously.[MG1.3]          # Hyponatremia[MG1.1]  Na 127 upon presentation.  Question hypovolemic hyponatremia (diarrhea, decrease PO) versus SIADH[MG1.2] or combination of the two[MG1.3].[MG1.2]  Urine studies to further differentiate pending.[MG1.3]  Will treat with small bolus of fluids to see if sodium levels improve[MG1.2].  If improves, continue IV hydration while being cognizant of electrolyte levels.[MG1.3]    - Pending:  Urine osmolality, random sodium urine, random chloride urine[MG1.2]  - BMP AM.  Based on correction may consider Q6hour versus BID testing.[MG1.3]    # Hypokalemia[MG1.1]  Potassium of 3.2 on admission.[MG1.2]  Setting of MEME and CKD.  Replace as needed.[MG1.3]      - Potassium chloride 30 mEq[MG1.2] one time dose  - IVF with supplemental potassium chloride[MG1.3]      # Diarrhea[MG1.1]  In the setting of recent antibiotic use[MG1.2]:  Fluoroquinolones[MG1.3].  Has improved over the past two days.  C. Diff testing negative.  Question gastroenteritis although no recent sick contacts, travel, nor new foods[MG1.2] versus antibiotic-associated diarrhea.  Giv[MG1.3]en IV Flagyl in the ED.  Will evaluate for potential causes and in the meantime treat symptomatically.      - Loperamide PRN  - Enteric Panel pending[MG1.2]    # Decreased Appetite[MG1.1]  Over the past few months.  No associated nausea or vomiting.  Will place on regular diet.  If concern regarding nutrition status, may consider nutrition consult.          # Recent Fall with scalp laceration (8/6)  Evaluated  at OSH[MG1.2].[MG1.3]  CT cervical spine and head unremarkable.  No sequale post fall and has not had any other episodes since.  ? Deconditioning component in setting of advanced age.  - PT/ OT evaluation           Chronic Problems  Osteoporosis:  Continue PTA Vitamin D[MG1.2]    # Pain Assessment:[MG1.1]   Reena mitchell pain level was assessed and she currently denies pain.[MG1.2]      Diet:[MG1.1]  Regular Diet[MG1.2]  Fluids:   DVT Prophylaxis:[MG1.1] Pneumatic Compression Devices[MG1.2]  Code Status:[MG1.1] Full Code[MG1.2]    Disposition Plan   Expected discharge:[MG1.1] 2 - 3 days[MG1.2]; recommended to[MG1.1] prior living arrangement[MG1.2] once[MG1.1] antibiotic plan established[MG1.2].     Entered: Karli Ryan 09/04/2018, 9:52 PM   Information in the above section will display in the discharge planner report.    The patient was discussed with [MG1.1] Andressa Benitez[MG1.2]    Karli Ryan  [facility][MG1.1] Nights[MG1.2]  [facility]   Pager:[MG1.1] [phone][MG1.2]  Please see sticky note for cross cover information      Data[MG1.1]   Data     Recent Labs  Lab 09/04/18  2300 09/04/18  1714   WBC  --  6.0   HGB  --  14.0   MCV  --  90   PLT  --  229   * 127*   POTASSIUM 2.9* 3.2*   CHLORIDE 101 93*   CO2 16* 16*   BUN 88* 92*   CR 3.40* 3.98*   ANIONGAP 16* 18*   SABRINA 7.1* 8.0*   GLC 88 93   ALBUMIN  --  3.9   PROTTOTAL  --  7.9   BILITOTAL  --  0.4   ALKPHOS  --  122   ALT  --  17   AST  --  20     Recent Results (from the past 24 hour(s))   US Renal Complete    Narrative    PRELIMINARY REPORT - The following report is a preliminary  interpretation.      Impression    IMPRESSION:   1. Unremarkable ultrasound of the left kidney. No evidence of  hydronephrosis.  2. The right kidney is surgically absent.[MG1.2]                  Revision History        User Key Date/Time User Provider Type Action    > MG1.3 9/5/2018  3:30 AM Karli Ryan Resident Sign     MG1.4 9/5/2018  1:33 AM  Karli Ryan Resident      MG1.2 9/5/2018  1:32 AM Karli Ryan Resident      MG1.1 9/4/2018  9:52 PM Karli Ryan Resident                   Discharge Summaries     No notes of this type exist for this encounter.      Consult Notes     No notes of this type exist for this encounter.         Progress Notes - Physician (Notes for yesterday and today)      Progress Notes by Delicia Pettit MD at 9/9/2018 10:27 AM     Author:  Delicia Pettit MD Service:  General Medicine Author Type:  Physician    Filed:  9/10/2018  5:16 AM Date of Service:  9/9/2018 10:27 AM Creation Time:  9/9/2018 10:27 AM    Status:  Signed :  Delicia Pettit MD (Physician)         [facility]    Internal Medicine Progress Note - [facility] Service    Today  - Only a few stools yesterday, appears to have stabilized with scheduled loperamide and pepto bismol  - Otherwise, no new changes to the medical plan, awaiting return to [facility]    Assessment & Plan   Reena Medellin is 89 year old female with a PMHx significant for osteoporosis, CKD, HTN, and renal cancer s/p right nephrectomy and recurrent cystitis, presenting with acute cystitis and diarrhea after failing several courses of outpatient antibiotic therapy. Also found to have an MEME, hypokalemia and hyponatremia on admission. Remains afebrile with improvement in urinary sx, electrolytes, and diarrhea.     # Acute cystitis  Patient with a history of recurrent cystitis presenting with ongoing urinary retention/ urgency/ frequency x1.5 months. Has been evaluated 3 times in the past few months for similar symptoms with treatment including Cipro, Cefdinir, and more recently Levaquin/amoxicillin on 8/14. UA on admission consistent with cystitis with UC pending.  No clinical signs of pyelonephritis:  CVA tenderness, fever, flank pain.  Was hypotensive on admission but lactic acid wnl. Recurrent cystitis likely 2/2 urinary  retention and stool incontinence. Renal US (s/p right nephrectomy 2/2 to renal cancer) negative for hydronephrosis.  - Cefepime discontinued (09/05, 09/06, 09/07). Hold off on starting PO antibiotics since patient completed adequate duration of IV therapy and is currently without signs or symptoms of infection  - Ucx showed >100K colonies of mixed urogenital terrie  - Blood cx- NGTD      # Hyponatremia, resolved  Na 127 upon presentation. Differential includes hypovolemic hyponatremia (diarrhea, decrease PO), SIADH vs combination of the two. Na improved after initiation of IVF and increased PO intake so likely hypovolemic hyponatremia.   - Continue to trend Na  - Continue to encourage PO intake      # Hypokalemia, resolved   Potassium of 3.2 on admission.  Likely 2/2 MEME in the setting of CKD.   - Continue to trend K  - Will replete as needed     # MEME, improving   # Anion Gap Metabolic Acidosis, resolved   Cr 3.98 upon presentation.  Hx of CKD.  However, baseline appears to be around 0.9-1.1.  Suspect prerenal in etiology given recent diarrhea, decreased oral intake and hypotension on presentation. Renal ultrasound negative for obstruction.   - Continue to tend BMP and encourage PO intake     # Diarrhea  Increased diarrhea for 2-3 days prior to admission. C. Diff negative.  Likely antibiotic-associated diarrhea in the setting of recent antibiotic use, though per further discussion with patient's family, this is a chronic issue, and may be dietary, recurrent antibiotics, and/or microscopic colitis. No recent sick contacts, travel, nor new foods. Received one dose of IV Flagyl in the ED.    - C. Diff and enteric panel negative  - Loperamide 4mg AM, 4mg PM + PRN  - Continue pepto bismol QID  - Will continue to monitor for improvement in diarrhea now that antibiotics have been discontinued     # Recent Fall with scalp laceration (8/6)  Evaluated at OSH.  CT cervical spine and head unremarkable.  No sequale post fall and  has not had any other episodes since.  ? Deconditioning component in setting of advanced age.  - PT/ OT evaluation, recommending home PT     Chronic Problems  Osteoporosis:  Continue PTA Vitamin D    # Pain Assessment:  Current Pain Score 9/9/2018   Patient currently in pain? denies   Reena s pain level was assessed and she currently denies pain.      Diet: Combination Diet Regular Diet Adult  Advance Diet as Tolerated  Fluids: off IVF  DVT Prophylaxis: Pneumatic Compression Devices  Code Status: DNR / DNI    Disposition Plan   Expected discharge: 1-2 days , recommended to prior living arrangement once vitals stable and urinary symptoms improved and diarrhea well controlled.     Entered: Raymond Perkins 09/09/2018, 10:27 AM   Information in the above section will display in the discharge planner report.      The patient's care was discussed with the Attending Physician, Dr. Salguero .    Raymond Perkins MD   [facility]  Pager: [phone]  Please see sticky note for cross cover information    Interval History   Nursing notes reviewed, no acute events overnight. Reports continued improvement in urinary retention/urgency. Only had 4 stools yesterday. Denies suprapubic pain, fevers, chills, chest pain or SOB. Appetite is improved. No other concerns.     Physical Exam   Vital Signs: Temp: 97.7  F (36.5  C) Temp src: Oral BP: 168/81 Pulse: 83   Resp: 20 SpO2: 96 % O2 Device: None (Room air)    Weight: 150 lbs 0 oz  General Appearance: Well appearing.  Resting comfortably in bed.  No acute distress.     HEENT: Two well healed excoriations on crown of head (from previous fall). PERRL, MMM.   Respiratory: Breathing comfortably on room air. Lungs CTAB, no crackles, rhonchi or wheezes  Cardiovascular: RRR.  No murmur, gallops, or rubs.  GI: Soft, non-distended, no suprapubic tenderness. No rebound or guarding. Normoactive BS.   Skin: No concerning lesions     Back: No CVA tenderness  Neurologic:  AOx3, no focal motor or sensory deficits.  Psychiatric: Normal mood and affect.      Data   Medications       aspirin  81 mg Oral Daily     bismuth subsalicylate  524 mg Oral 4x Daily AC & HS     cholecalciferol  400 Units Oral Daily     loperamide  4 mg Oral QAM     loperamide  4 mg Oral QPM     potassium chloride  20 mEq Oral Daily     Data     Recent Labs  Lab 09/08/18  0716 09/07/18  0737 09/06/18  0633  09/04/18  1714   WBC  --   --  5.3  --  6.0   HGB  --   --  11.8  --  14.0   MCV  --   --  89  --  90   PLT  --   --  170  --  229    145* 145*  < > 127*   POTASSIUM 4.1 4.0 3.0*  < > 3.2*   CHLORIDE 117* 118* 113*  < > 93*   CO2 18* 20 17*  < > 16*   BUN 27 40* 63*  < > 92*   CR 0.94 1.13* 1.45*  < > 3.98*   ANIONGAP 8 7 14  < > 18*   SABRINA 8.2* 8.1* 7.9*  < > 8.0*   GLC 88 84 84  < > 93   ALBUMIN  --   --   --   --  3.9   PROTTOTAL  --   --   --   --  7.9   BILITOTAL  --   --   --   --  0.4   ALKPHOS  --   --   --   --  122   ALT  --   --   --   --  17   AST  --   --   --   --  20   < > = values in this interval not displayed.  No results found for this or any previous visit (from the past 24 hour(s)).[KS1.1]     Pt was seen and examined by me with the team on morning rounds; confirmed abdominal exam findings, reviewed labs, vitals, imaging.  I agree with the detailed A/P documentation above.  Diarrhea appears to have improved, continue loperamide and bismuth.  Completed course of antibiotics for UTI.  Transfer back to assisted living tomorrow.      Delicia Salguero MD[HT1.1]     Revision History        User Key Date/Time User Provider Type Action    > HT1.1 9/10/2018  5:16 AM Delicia Pettit MD Physician Sign     KS1.1 9/9/2018 10:32 AM Raymond Perkins MD Resident Sign            Progress Notes by Delicia Pettit MD at 9/8/2018  6:37 AM     Author:  Delicia Pettit MD Service:  General Medicine Author Type:  Physician    Filed:  9/9/2018  5:50 AM Date of  Service:  9/8/2018  6:37 AM Creation Time:  9/8/2018  6:37 AM    Status:  Signed :  Delicia Pettit MD (Physician)         [facility], [place]    Internal Medicine Progress Note - [facility] Service    Today  - Less stool output over the past 24 hours with increased dose of loperamide and starting pepto bismol. Will continue to monitor today.[AK1.1]   - Urinary retention/urgency resolved  - Appetite improved  - No electrolyte derrangements[AK1.2]     Assessment & Plan   Reena Medellin is 89 year old female with a PMHx significant for osteoporosis, CKD, HTN, and renal cancer s/p right nephrectomy and recurrent cystitis, presenting with acute cystitis and diarrhea after failing several courses of outpatient antibiotic therapy. Also found to have an MEME, hypokalemia and hyponatremia on admission.[AK1.1] Remains afebrile with improvement in urinary sx and diarrhea.[AK1.2]     # Acute cystitis  Patient with a history of recurrent cystitis presenting with ongoing urinary retention/ urgency/ frequency x1.5 months. Has been evaluated 3 times in the past few months for similar symptoms with treatment including Cipro, Cefdinir, and more recently Levaquin/amoxicillin on 8/14. UA on admission consistent with cystitis with UC pending.  No clinical signs of pyelonephritis:  CVA tenderness, fever, flank pain.  Was hypotensive on admission but lactic acid wnl. Recurrent cystitis likely 2/2 urinary retention and stool incontinence. Renal US (s/p right nephrectomy 2/2 to renal cancer) negative for hydronephrosis.  -[AK1.1] C[AK1.2]efepime[AK1.1] discontinued[AK1.2] (09/05, 09/06, 09/07).[AK1.1] H[AK1.2]old off on starting PO antibiotics since patient completed adequate duration of IV therapy and is currently without signs or symptoms of infection  - Ucx showed >100K colonies of mixed urogenital terrie  - Blood cx- NGTD      # Hyponatremia, resolved  Na 127 upon presentation. Differential  includes hypovolemic hyponatremia (diarrhea, decrease PO), SIADH vs combination of the two. Na improved after initiation of IVF and increased PO intake so likely hypovolemic hyponatremia.   - Continue to trend Na  - Continue to encourage PO intake      # Hypokalemia, resolved   Potassium of 3.2 on admission.  Likely 2/2 MEME in the setting of CKD.   - Continue to trend K  - Will replete as needed     # MEME, improving   # Anion Gap Metabolic Acidosis, resolved   Cr 3.98 upon presentation.  Hx of CKD.  However, baseline appears to be around 0.9-1.1.  Suspect prerenal in etiology given recent diarrhea, decreased oral intake and hypotension on presentation. Renal ultrasound negative for obstruction.   - Continue to tend BMP and encourage PO intake     # Diarrhea  Increased diarrhea for 2-3 days prior to admission. C. Diff negative.  Likely antibiotic-associated diarrhea in the setting of recent antibiotic use, though per further discussion with patient's family, this is a chronic issue, and may be dietary, recurrent antibiotics, and/or microscopic colitis. No recent sick contacts, travel, nor new foods. Received one dose of IV Flagyl in the ED.    - C. Diff[AK1.1] and e[AK1.2]nteric panel negative  - Loperamide 4mg AM, 4mg PM + PRN, will uptitrate as tolerated   -[AK1.1] Continue[AK1.2] pepto bismol QID  - Will[AK1.1] continue to[AK1.2] monitor for improvement in diarrhea now that antibiotics have been discontinued     # Recent Fall with scalp laceration (8/6)  Evaluated at OSH.  CT cervical spine and head unremarkable.  No sequale post fall and has not had any other episodes since.  ? Deconditioning component in setting of advanced age.  - PT/ OT evaluation, recommending home PT     Chronic Problems  Osteoporosis:  Continue PTA Vitamin D    # Pain Assessment:  Current Pain Score 9/8/2018   Patient currently in pain? denies   Reena s pain level was assessed and she currently denies pain.      Diet: Combination Diet  Regular Diet Adult  Advance Diet as Tolerated  Fluids: off IVF  DVT Prophylaxis: Pneumatic Compression Devices  Code Status: DNR / DNI    Disposition Plan   Expected discharge: 1-2 days , recommended to prior living arrangement once vitals stable and[AK1.1] urinary symptoms improved and diarrhea well controlled[AK1.2].     Entered: Myra Valle 09/08/2018, 6:37 AM   Information in the above section will display in the discharge planner report.      The patient's care was discussed with the Attending Physician, Dr. Salguero .    Myra Valle MD   [facility]  Pager: [phone]  Please see sticky note for cross cover information    Interval History   Nursing notes reviewed, no acute events overnight.[AK1.1] Reports continued improvement in urinary retention/urgency and[AK1.2] diarrhea[AK1.1]. Denies suprapubic pain,[AK1.2] fevers, chills, chest pain or SOB.[AK1.1] Appetite is improved.[AK1.2] No other concerns.     Physical Exam   Vital Signs: Temp: 96.2  F (35.7  C) Temp src: Oral BP: 131/63 Pulse: 61   Resp: 16 SpO2: 98 % O2 Device: None (Room air)    Weight: 150 lbs 0 oz  General Appearance: Well appearing.  Resting comfortably in bed.  No acute distress.     HEENT: Two well healed excoriations on crown of head (from previous fall). PERRL, MMM.   Respiratory: Breathing comfortably on room air. Lungs CTAB, no crackles, rhonchi or wheezes  Cardiovascular: RRR.  No murmur, gallops, or rubs.  GI: Soft, non-distended,[AK1.1] no[AK1.2] suprapubic tenderness. No rebound or guarding. Normoactive BS.   Skin: No concerning lesions     Back: No CVA tenderness  Neurologic: AOx3, no focal motor or sensory deficits.  Psychiatric: Normal mood and affect.      Data   Medications       aspirin  81 mg Oral Daily     bismuth subsalicylate  524 mg Oral 4x Daily AC & HS     cholecalciferol  400 Units Oral Daily     loperamide  4 mg Oral QAM     loperamide  4 mg Oral QPM     potassium chloride  20 mEq  Oral Daily     Data     Recent Labs  Lab 09/07/18  0737 09/06/18  0633 09/05/18  1327  09/04/18  1714   WBC  --  5.3  --   --  6.0   HGB  --  11.8  --   --  14.0   MCV  --  89  --   --  90   PLT  --  170  --   --  229   * 145* 129*  < > 127*   POTASSIUM 4.0 3.0* 3.8  < > 3.2*   CHLORIDE 118* 113* 108  < > 93*   CO2 20 17* 10*  < > 16*   BUN 40* 63* 81*  < > 92*   CR 1.13* 1.45* 2.22*  < > 3.98*   ANIONGAP 7 14 11  < > 18*   SABRINA 8.1* 7.9* 7.4*  < > 8.0*   GLC 84 84 99  < > 93   ALBUMIN  --   --   --   --  3.9   PROTTOTAL  --   --   --   --  7.9   BILITOTAL  --   --   --   --  0.4   ALKPHOS  --   --   --   --  122   ALT  --   --   --   --  17   AST  --   --   --   --  20   < > = values in this interval not displayed.  No results found for this or any previous visit (from the past 24 hour(s)).[AK1.1]     Pt was seen and examined with the team on morning rounds; diarrhea and stool output have improved with addition of bismuth plus loperamide. Completed course of IV abx for UTI; monitor for urinary retention. I agree with the detailed A/P documentation above.  Transfer back to assisted living on Monday.    Delicia Salguero MD[HT1.1]     Revision History        User Key Date/Time User Provider Type Action    > HT1.1 9/9/2018  5:50 AM Delicia Pettit MD Physician Sign     AK1.2 9/8/2018 11:31 AM Myra Valle MD Resident Sign     AK1.1 9/8/2018  6:37 AM Myra Valle MD Resident                   Procedure Notes     No notes of this type exist for this encounter.         Progress Notes - Therapies (Notes from 09/07/18 through 09/10/18)      Progress Notes by George Blackman, OT at 9/7/2018  4:22 PM     Author:  George Blackman OT Service:  (none) Author Type:  Occupational Therapist    Filed:  9/7/2018  4:22 PM Date of Service:  9/7/2018  4:22 PM Creation Time:  9/7/2018  4:22 PM    Status:  Signed :  George Blackman OT (Occupational Therapist)            09/07/18 1600   Quick  Adds   Type of Visit Initial Occupational Therapy Evaluation   Living Environment   Lives With facility resident   Living Arrangements assisted living   Home Accessibility no concerns   Number of Stairs to Enter Home 0   Number of Stairs Within Home 0   Transportation Available family or friend will provide   Living Environment Comment Pt goes to dining room for meals   Self-Care   Usual Activity Tolerance moderate   Current Activity Tolerance fair   Regular Exercise no   Equipment Currently Used at Home grab bar;raised toilet;shower chair;walker, rolling   Activity/Exercise/Self-Care Comment Pt reports assistance with showering and occasionally for dressing   Functional Level Prior   Ambulation 1-->assistive equipment   Transferring 1-->assistive equipment   Toileting 1-->assistive equipment   Bathing 3-->assistive equipment and person   Dressing 0-->independent   Eating 0-->independent   Communication 0-->understands/communicates without difficulty   Swallowing 0-->swallows foods/liquids without difficulty   Cognition 0 - no cognition issues reported   Fall history within last six months yes   Number of times patient has fallen within last six months 1   Which of the above functional risks had a recent onset or change? ambulation;transferring;fall history   Prior Functional Level Comment Pt utilizes 4WW, some assistance with dressing and assistance for bathing   General Information   Onset of Illness/Injury or Date of Surgery - Date 09/04/18   Referring Physician Raymond Bryson   Patient/Family Goals Statement To return home   Additional Occupational Profile Info/Pertinent History of Current Problem Reena Medellin is 89 year old female with a PMHx significant for osteoporosis, CKD, HTN, and renal cancer s/p right nephrectomy and recurrent cystitis, presenting with acute cystitis and diarrhea after failing several courses of outpatient antibiotic therapy. Also found to have an MEME, hypokalemia and hyponatremia on  "admission   Precautions/Limitations fall precautions   Sensory Examination   Sensory Quick Adds No deficits were identified   Pain Assessment   Patient Currently in Pain No   Integumentary/Edema   Integumentary/Edema no deficits were identifed   Range of Motion (ROM)   ROM Quick Adds No deficits were identified   Strength   Manual Muscle Testing Quick Adds No deficits were identified   Coordination   Upper Extremity Coordination No deficits were identified   Activities of Daily Living Analysis   Impairments Contributing to Impaired Activities of Daily Living strength decreased;cognition impaired   General Therapy Interventions   Planned Therapy Interventions ADL retraining;cognition   Clinical Impression   Criteria for Skilled Therapeutic Interventions Met yes, treatment indicated;evaluation only   OT Diagnosis decreased ADL independence   Influenced by the following impairments medical status, cognition   Assessment of Occupational Performance 3-5 Performance Deficits   Identified Performance Deficits dressing, grooming, bathing   Clinical Decision Making (Complexity) Low complexity   Therapy Frequency 5 times/wk   Predicted Duration of Therapy Intervention (days/wks) 3 days   Anticipated Discharge Disposition Long Term Care Facility;Home with Assist  (return to [facility])   Risks and Benefits of Treatment have been explained. Yes   Patient, Family & other staff in agreement with plan of care Yes   Clinical Impression Comments Pt presents with decreased activity tolerance and endurance and cognitive difficulties resulting in decreased ADL independence.   [facility] AM-PAC TM \"6 Clicks\"   2016, Trustees of [facility], under license to [employer].  All rights reserved.   6 Clicks Short Forms Daily Activity Inpatient Short Form   [facility] AM-PAC  \"6 Clicks\" Daily Activity Inpatient Short Form   1. Putting on and taking off regular lower body clothing? 3 - A Little   2. Bathing (including washing, " rinsing, drying)? 2 - A Lot   3. Toileting, which includes using toilet, bedpan or urinal? 4 - None   4. Putting on and taking off regular upper body clothing? 3 - A Little   5. Taking care of personal grooming such as brushing teeth? 3 - A Little   6. Eating meals? 4 - None   Daily Activity Raw Score (Score out of 24.Lower scores equate to lower levels of function) 19   Total Evaluation Time   Total Evaluation Time (Minutes) 5[LS1.1]        Revision History        User Key Date/Time User Provider Type Action    > LS1.1 9/7/2018  4:22 PM George Blackman, OT Occupational Therapist Sign                                                      INTERAGENCY TRANSFER FORM - LAB / IMAGING / EKG / EMG RESULTS   9/4/2018                       UNIT 5A [facility]: [phone]            Unresulted Labs     None         Lab Results - 3 Days      Blood culture [[id number]]  Resulted: 09/10/18 0742, Result status: Final result    Ordering provider: Bryce Jackson,   09/04/18 1812 Resulting lab: [facility]    Specimen Information    Type Source Collected On   Blood Arm, Forearm Only, Left 09/04/18 1927   Comment:  Left Arm          Components       Value Reference Range Flag Lab   Specimen Description Blood Left Arm      Special Requests Received in aerobic bottle only   75   Culture Micro No growth   225            Blood culture [[id number]]  Resulted: 09/10/18 0742, Result status: Final result    Ordering provider: Bryce Jackson DO  09/04/18 1812 Resulting lab: [facility]    Specimen Information    Type Source Collected On   Blood Arm, Left 09/04/18 1913   Comment:  Left Arm          Components       Value Reference Range Flag Lab   Specimen Description Blood Left Arm      Special Requests Received in aerobic bottle only   75   Culture Micro No growth   225            Basic metabolic panel [[id number]] (Abnormal)  Resulted: 09/10/18 0659, Result  status: Final result    Ordering provider: Raymond Perkins MD  09/10/18 0000 Resulting lab: [facility]    Specimen Information    Type Source Collected On   Blood  09/10/18 0616          Components       Value Reference Range Flag Lab   Sodium 144 133 - 144 mmol/L  51   Potassium 4.6 3.4 - 5.3 mmol/L  51   Chloride 116 94 - 109 mmol/L H 51   Carbon Dioxide 21 20 - 32 mmol/L  51   Anion Gap 7 3 - 14 mmol/L  51   Glucose 86 70 - 99 mg/dL  51   Urea Nitrogen 16 7 - 30 mg/dL  51   Creatinine 0.91 0.52 - 1.04 mg/dL  51   GFR Estimate 57 >60 mL/min/1.7m2 L 51   Comment:  Non  GFR Calc   GFR Estimate If Black 69 >60 mL/min/1.7m2  51   Comment:  African American GFR Calc   Calcium 8.4 8.5 - 10.1 mg/dL L 51            Basic metabolic panel [[id number]] (Abnormal)  Resulted: 09/09/18 1542, Result status: Final result    Ordering provider: Myra Valle MD  09/08/18 2342 Resulting lab: [facility]    Specimen Information    Type Source Collected On   Blood  09/09/18 0730          Components       Value Reference Range Flag Lab   Sodium 144 133 - 144 mmol/L  51   Potassium 4.0 3.4 - 5.3 mmol/L  51   Chloride 117 94 - 109 mmol/L H 51   Carbon Dioxide 20 20 - 32 mmol/L  51   Anion Gap 7 3 - 14 mmol/L  51   Glucose 84 70 - 99 mg/dL  51   Urea Nitrogen 17 7 - 30 mg/dL  51   Creatinine 0.90 0.52 - 1.04 mg/dL  51   GFR Estimate 58 >60 mL/min/1.7m2 L 51   Comment:  Non  GFR Calc   GFR Estimate If Black 70 >60 mL/min/1.7m2  51   Comment:  African American GFR Calc   Calcium 8.2 8.5 - 10.1 mg/dL L 51            Basic metabolic panel [[id number]] (Abnormal)  Resulted: 09/08/18 0811, Result status: Final result    Ordering provider: Myra Valle MD  09/08/18 0001 Resulting lab: [facility]    Specimen Information    Type Source Collected On   Blood  09/08/18 0716          Components        Value Reference Range Flag Lab   Sodium 144 133 - 144 mmol/L  51   Potassium 4.1 3.4 - 5.3 mmol/L  51   Chloride 117 94 - 109 mmol/L H 51   Carbon Dioxide 18 20 - 32 mmol/L L 51   Anion Gap 8 3 - 14 mmol/L  51   Glucose 88 70 - 99 mg/dL  51   Urea Nitrogen 27 7 - 30 mg/dL  51   Creatinine 0.94 0.52 - 1.04 mg/dL  51   GFR Estimate 55 >60 mL/min/1.7m2 L 51   Comment:  Non  GFR Calc   GFR Estimate If Black 67 >60 mL/min/1.7m2  51   Comment:  African American GFR Calc   Calcium 8.2 8.5 - 10.1 mg/dL L 51            Basic metabolic panel [[id number]] (Abnormal)  Resulted: 09/07/18 0824, Result status: Final result    Ordering provider: Raymond Perkins MD  09/07/18 0708 Resulting lab: [facility]    Specimen Information    Type Source Collected On   Blood  09/07/18 0737          Components       Value Reference Range Flag Lab   Sodium 145 133 - 144 mmol/L H 51   Potassium 4.0 3.4 - 5.3 mmol/L  51   Chloride 118 94 - 109 mmol/L H 51   Carbon Dioxide 20 20 - 32 mmol/L  51   Anion Gap 7 3 - 14 mmol/L  51   Glucose 84 70 - 99 mg/dL  51   Urea Nitrogen 40 7 - 30 mg/dL H 51   Creatinine 1.13 0.52 - 1.04 mg/dL H 51   GFR Estimate 45 >60 mL/min/1.7m2 L 51   Comment:  Non  GFR Calc   GFR Estimate If Black 54 >60 mL/min/1.7m2 L 51   Comment:  African American GFR Calc   Calcium 8.1 8.5 - 10.1 mg/dL L 51            Urine Culture Aerobic Bacterial [[id number]]  Resulted: 09/07/18 0438, Result status: Final result    Ordering provider: Andressa Benitez MD  09/04/18 1944 Resulting lab: [facility]    Specimen Information    Type Source Collected On   Midstream Urine  09/04/18 1944          Components       Value Reference Range Flag Lab   Specimen Description Midstream Urine      Culture Micro --   225   Result:         >100,000 colonies/mL  mixed urogenital terrie              Testing Performed By     Lab - Abbreviation Name Director  Address Valid Date Range    51 - Unknown [facility] Unknown [address] [facility] [id number] 12/31/14 1010 - Present    75 - Unknown [facility] Unknown [address] [facility] [id number] 01/15/15 1019 - Present    225 - Unknown [facility] Unknown [address] [facility] [id number] 12/19/14 0954 - Present            Encounter-Level Documents:     There are no encounter-level documents.      Order-Level Documents:     There are no order-level documents.

## 2018-09-04 NOTE — ED PROVIDER NOTES
[place] EMERGENCY DEPARTMENT ([facility])  9/04/18 [facility]    History     Chief Complaint   Patient presents with     Hypotension     Diarrhea     HPI  Reena Medellin is a 89-year-old female with a history of C. diff who presents with diarrhea and concern for dehydration. Patient reports that she has been having multiple episodes of diarrhea per day for the past 4-5 days. She states that she was recently diagnosed with a UTI (8/13) and was subsequently placed on a course of amoxicillin and levofloxacin which she reports finishing. Additionally, the patient's daughter states that the patient has not been eating normally for the last few days. Patient notes that she had some nausea earlier today but is unsure if it was due to riding in the car or her GI/ symptoms. She denies fevers.      Patient's daughter reports that the patient had a recent fall where she hit her head. Patient was seen for this at [facility] ED on 8/13 where she had a CT of the head and C-spine; please see interpretation below. Patient's daughter is concerned that the bump on her head has worsened.    [facility]    CT HEAD WO IV CONT, CT TRAUMA CERVICAL SCREEN T4-C1    8/13/2018 10:38 AM   CONCLUSION:   HEAD CT:    1.  No acute intracranial abnormality.    2.  No calvarial or skull base fracture.        CERVICAL SPINE CT:    1.  No CT evidence for acute fracture or post traumatic subluxation.    2.  Unchanged multilevel cervical spondylosis, worst at C5-C6 and C6-C7.    I have reviewed the Medications, Allergies, Past Medical and Surgical History, and Social History in the Epic system.    Current Facility-Administered Medications   Medication     0.9% sodium chloride BOLUS    Followed by     sodium chloride 0.9% infusion     0.9% sodium chloride BOLUS    Followed by     sodium chloride 0.9% infusion     metroNIDAZOLE (FLAGYL) infusion 500 mg     sodium chloride 0.9% infusion     No current outpatient prescriptions on file.     No  past medical history on file.    No past surgical history on file.    No family history on file.    Social History   Substance Use Topics     Smoking status: Not on file     Smokeless tobacco: Not on file     Alcohol use Not on file     Allergies   Allergen Reactions     Sulfa Drugs        Review of Systems   Constitutional: Positive for appetite change. Negative for fever.   Gastrointestinal: Positive for diarrhea and nausea.   All other systems reviewed and are negative.      Physical Exam   BP: (!) 87/56  Pulse: 72  Temp: 97  F (36.1  C)  Resp: 18  SpO2: 97 %      Physical Exam   Constitutional: No distress.   HENT:   Head: Atraumatic.   Mouth/Throat: Oropharynx is clear and moist. No oropharyngeal exudate.   Eyes: EOM are normal. No scleral icterus.   Neck: Neck supple.   Cardiovascular: Normal rate, regular rhythm and normal heart sounds.    Pulmonary/Chest: Breath sounds normal. No respiratory distress.   Abdominal: Soft. She exhibits no distension.   Mild diffuse abdominal tenderness   Musculoskeletal: She exhibits no edema or deformity.   Neurological: She is alert.   Skin: Skin is warm and dry. She is not diaphoretic.   Psychiatric: She has a normal mood and affect. Her behavior is normal.       ED Course   6:09 PM  The patient was seen and examined by Bryce Jackson DO in Room [facility].   ED Course     Procedures     Labs Ordered and Resulted from Time of ED Arrival Up to the Time of Departure from the ED   COMPREHENSIVE METABOLIC PANEL - Abnormal; Notable for the following:        Result Value    Sodium 127 (*)     Potassium 3.2 (*)     Chloride 93 (*)     Carbon Dioxide 16 (*)     Anion Gap 18 (*)     Urea Nitrogen 92 (*)     Creatinine 3.98 (*)     GFR Estimate 10 (*)     GFR Estimate If Black 13 (*)     Calcium 8.0 (*)     All other components within normal limits   CBC WITH PLATELETS DIFFERENTIAL   UA MACROSCOPIC WITH REFLEX TO MICRO AND CULTURE   LACTIC ACID WHOLE BLOOD   CLOSTRIDIUM DIFFICILE TOXIN B    BLOOD CULTURE   BLOOD CULTURE          Results for orders placed or performed during the hospital encounter of 09/04/18   CBC with platelets differential   Result Value Ref Range    WBC 6.0 4.0 - 11.0 10e9/L    RBC Count 4.49 3.8 - 5.2 10e12/L    Hemoglobin 14.0 11.7 - 15.7 g/dL    Hematocrit 40.6 35.0 - 47.0 %    MCV 90 78 - 100 fl    MCH 31.2 26.5 - 33.0 pg    MCHC 34.5 31.5 - 36.5 g/dL    RDW 13.6 10.0 - 15.0 %    Platelet Count 229 150 - 450 10e9/L    Diff Method Automated Method     % Neutrophils 66.6 %    % Lymphocytes 24.5 %    % Monocytes 8.0 %    % Eosinophils 0.3 %    % Basophils 0.3 %    % Immature Granulocytes 0.3 %    Nucleated RBCs 0 0 /100    Absolute Neutrophil 4.0 1.6 - 8.3 10e9/L    Absolute Lymphocytes 1.5 0.8 - 5.3 10e9/L    Absolute Monocytes 0.5 0.0 - 1.3 10e9/L    Absolute Eosinophils 0.0 0.0 - 0.7 10e9/L    Absolute Basophils 0.0 0.0 - 0.2 10e9/L    Abs Immature Granulocytes 0.0 0 - 0.4 10e9/L    Absolute Nucleated RBC 0.0    Comprehensive metabolic panel   Result Value Ref Range    Sodium 127 (L) 133 - 144 mmol/L    Potassium 3.2 (L) 3.4 - 5.3 mmol/L    Chloride 93 (L) 94 - 109 mmol/L    Carbon Dioxide 16 (L) 20 - 32 mmol/L    Anion Gap 18 (H) 3 - 14 mmol/L    Glucose 93 70 - 99 mg/dL    Urea Nitrogen 92 (H) 7 - 30 mg/dL    Creatinine 3.98 (H) 0.52 - 1.04 mg/dL    GFR Estimate 10 (L) >60 mL/min/1.7m2    GFR Estimate If Black 13 (L) >60 mL/min/1.7m2    Calcium 8.0 (L) 8.5 - 10.1 mg/dL    Bilirubin Total 0.4 0.2 - 1.3 mg/dL    Albumin 3.9 3.4 - 5.0 g/dL    Protein Total 7.9 6.8 - 8.8 g/dL    Alkaline Phosphatase 122 40 - 150 U/L    ALT 17 0 - 50 U/L    AST 20 0 - 45 U/L       Assessments & Plan (with Medical Decision Making)   89-year-old female presents with a chief complaint of abdominal discomfort and severe diarrhea.  Patient has been on a variety of different antibiotics over the last month.  For the last several days she has had multiple episodes daily of severe diarrhea.  Patient's  creatinine is severely elevated at 3.98.  Previous results were normal in care everywhere.  Patient has no fever or white count currently.  Given the recent antibiotic usage and concern for C. difficile.  She was given Flagyl as well as IV fluids.  C. difficile PCR was ordered and sent to lab. Discussed with internal medicine triage who accepted patient.    I have reviewed the nursing notes.    I have reviewed the findings, diagnosis, plan and need for follow up with the patient.    New Prescriptions    No medications on file       Final diagnoses:   Clostridium difficile diarrhea - Suspected   Diarrhea, unspecified type   Acute kidney injury (H)   I, Mary Caceres, am serving as a trained medical scribe to document services personally performed by Bryce Jackson DO, based on the provider's statements to me.   I, Bryce Jackson DO, was physically present and have reviewed and verified the accuracy of this note documented by Mary Caceres.    9/4/2018   [facility], [place], EMERGENCY DEPARTMENT     Bryce Jackson DO  09/04/18 9010

## 2018-09-04 NOTE — ED TRIAGE NOTES
Patient BIBA from [facility] for dehydration r/t diarrhea. Was hypotensive at the clinic and EMS was called. /70 per EMS.

## 2018-09-04 NOTE — IP AVS SNAPSHOT
STOP!!! DO NOT PRINT OR REFERENCE THIS AVS!!!  AVS displayed here is NOT the version that was given to the patient at discharge.  GO TO CHART REVIEW to print or review a copy of the AVS that was frozen/printed at time of discharge.                           MRN:[id number]                      After Visit Summary   9/4/2018    Reena Medellin    MRN: [id number]           Thank you!     Thank you for choosing [facility] for your care. Our goal is always to provide you with excellent care. Hearing back from our patients is one way we can continue to improve our services. Please take a few minutes to complete the written survey that you may receive in the mail after you visit with us. Thank you!        Patient Information     Date Of Birth          9/5/1921        Designated Caregiver       Most Recent Value    Caregiver    Will someone help with your care after discharge? yes    Name of designated caregiver [facility]    Phone number of caregiver [phone]    Caregiver address [address], [place] [address]      About your hospital stay     You were admitted on:  September 5, 2018 You last received care in the:  [address] [facility]    You were discharged on:  September 10, 2018        Reason for your hospital stay       You were admitted to the hospital a urinary tract infection and diarrhea. You were treated with IV antibiotics for the urinary tract infection. Your dose of loperamide was also increased to help with your diarrhea, which is likely due to your recent use of antibiotics.                  Who to Call     For medical emergencies, please call 911.  For non-urgent questions about your medical care, please call your primary care provider or clinic, None          Attending Provider     Provider Specialty    Bryce Jackson,  Emergency Medicine    Andressa Benitez MD Internal Medicine    Delicia Pettit MD Internal Medicine       Primary Care Provider Fax #     Physician No Ref-Primary [phone]      After Care Instructions     Activity - Up ad coco           Advance Diet as Tolerated       Follow this diet upon discharge: Regular            Encourage PO fluids           General info for SNF       Length of Stay Estimate: Long Term Care  Condition at Discharge: Improving  Level of care:board and care  Rehabilitation Potential: Good  Admission H&P remains valid and up-to-date: Yes  Recent Chemotherapy: N/A  Use Nursing Home Standing Orders: Yes            Mantoux instructions       Give two-step Mantoux (PPD) Per Facility Policy Yes                  Follow-up Appointments     Follow Up and recommended labs and tests       - Follow up with primary care provider early next week to ensure adequate treatment of cystitis and diarrhea. The following labs/tests are recommended: Basic metabolic panel.                  Additional Services     Home Care OT Referral for Hospital Discharge       [facility]  Ph: [phone]  Fax: [phone]    OT to eval and treat    Your provider has ordered home care - occupational therapy. If you have not been contacted within 2 days of your discharge please call the department phone number listed on the top of this document.            Home Care PT Referral for Hospital Discharge       [facility]  Ph: [phone]  Fax: [phone]    PT to eval and treat    Your provider has ordered home care - physical therapy. If you have not been contacted within 2 days of your discharge please call the department phone number listed on the top of this document.            Home care nursing referral       [facility]  Ph: [phone]  Fax: [phone]    RN skilled nursing visit. RN to assess vital signs and weight, respiratory and cardiac status, pain level and activity tolerance, hydration, nutrition and bowel status and home safety.  RN to teach medication management.  Home health aide to assist with ADLS    Your provider has  "ordered home care nursing services. If you have not been contacted within 2 days of your discharge please call the inpatient department phone number at [phone] .            Physical Therapy Adult Consult       Evaluate and treat as clinically indicated.    Reason:  Deconditioning in the setting of ongoing urinary tract infection                  Pending Results     No orders found from 2018 to 2018.            Statement of Approval     Ordered          09/10/18 1006  I have reviewed and agree with all the recommendations and orders detailed in this document.  EFFECTIVE NOW     Approved and electronically signed by:  Delicia Pettit MD             Admission Information     Date & Time Department Dept. Phone    2018 [address] [facility] [phone]      Your Vitals Were     Blood Pressure Pulse Temperature Respirations Weight Pulse Oximetry    167/81 (BP Location: Right arm) 81 98.3  F (36.8  C) (Oral) 16 68 kg (150 lb) 100%      MyChart Information     [employer] lets you send messages to your doctor, view your test results, renew your prescriptions, schedule appointments and more. To sign up, go to [email or web address] . Click on \"Log in\" on the left side of the screen, which will take you to the Welcome page. Then click on \"Sign up Now\" on the right side of the page.     You will be asked to enter the access code listed below, as well as some personal information. Please follow the directions to create your username and password.     Your access code is: [email or web address]  Expires: 2018 11:33 AM     Your access code will  in 90 days. If you need help or a new code, please call your [facility] clinic or [phone].        Care EveryWhere ID     This is your Care EveryWhere ID. This could be used by other organizations to access your [facility] medical records  [id number]        Equal Access to Services     LUIS MANUEL HERNANDEZ AH: Micheal Buchanan, chevy velazco, valerie " destiny hardin edelsandrine olmedocarri villatoroaasandrine ah. [facility] [phone].    ATENCIÓN: Si gin barnes, tiene a gil disposición servicios gratuitos de asistencia lingüística. Llkaci al [phone].    We comply with applicable federal civil rights laws and [place] laws. We do not discriminate on the basis of race, color, national origin, age, disability, sex, sexual orientation, or gender identity.               Review of your medicines      START taking        Dose / Directions    bismuth subsalicylate 262 MG chewable tablet   Commonly known as:  PEPTO BISMOL   Used for:  Diarrhea, unspecified type        Dose:  524 mg   Take 2 tablets (524 mg) by mouth 4 times daily (before meals and nightly)   Quantity:  56 tablet   Refills:  1         CONTINUE these medicines which may have CHANGED, or have new prescriptions. If we are uncertain of the size of tablets/capsules you have at home, strength may be listed as something that might have changed.        Dose / Directions    loperamide 2 MG capsule   Commonly known as:  IMODIUM   This may have changed:    - when to take this  - reasons to take this   Used for:  Diarrhea, unspecified type        Dose:  2 mg   Take 1 capsule (2 mg) by mouth 2 times daily as needed for diarrhea (titrate to 1-2 stools daily)   Quantity:  30 capsule   Refills:  1         CONTINUE these medicines which have NOT CHANGED        Dose / Directions    acetaminophen 500 MG tablet   Commonly known as:  TYLENOL        Dose:  1000 mg   Take 1,000 mg by mouth every 8 hours as needed for mild pain   Refills:  0       aspirin 81 MG EC tablet        Dose:  81 mg   Take 81 mg by mouth daily   Refills:  0       CALAZIME SKIN PROTECTANT EX        Dose:  1 g   Externally apply 1 g topically as needed   Refills:  0       melatonin 5 MG tablet        Dose:  5 mg   Take 5 mg by mouth nightly as needed for sleep   Refills:  0       metoprolol succinate 100 MG 24 hr tablet   Commonly known as:   TOPROL-XL        Dose:  100 mg   Take 100 mg by mouth daily   Refills:  0       MULTIVITAMIN ADULT PO        Dose:  1 tablet   Take 1 tablet by mouth daily   Refills:  0       triamcinolone 0.1 % cream   Commonly known as:  KENALOG        Apply topically 2 times daily TO AFFECTED AREAS OF LEG FOR 3-4 WEEKS FOR ATOPIC DERMATITIS   Refills:  0       Vitamin D3 400 units Caps        Dose:  1 capsule   Take 1 capsule by mouth daily   Refills:  0            Where to get your medicines      Some of these will need a paper prescription and others can be bought over the counter. Ask your nurse if you have questions.     Bring a paper prescription for each of these medications     bismuth subsalicylate 262 MG chewable tablet    loperamide 2 MG capsule                Protect others around you: Learn how to safely use, store and throw away your medicines at www.disposemymeds.org.             Medication List: This is a list of all your medications and when to take them. Check marks below indicate your daily home schedule. Keep this list as a reference.      Medications           Morning Afternoon Evening Bedtime As Needed    acetaminophen 500 MG tablet   Commonly known as:  TYLENOL   Take 1,000 mg by mouth every 8 hours as needed for mild pain                                aspirin 81 MG EC tablet   Take 81 mg by mouth daily   Last time this was given:  81 mg on 9/10/2018  8:48 AM                                bismuth subsalicylate 262 MG chewable tablet   Commonly known as:  PEPTO BISMOL   Take 2 tablets (524 mg) by mouth 4 times daily (before meals and nightly)   Last time this was given:  524 mg on 9/8/2018  4:55 PM                                CALAZIME SKIN PROTECTANT EX   Externally apply 1 g topically as needed                                loperamide 2 MG capsule   Commonly known as:  IMODIUM   Take 1 capsule (2 mg) by mouth 2 times daily as needed for diarrhea (titrate to 1-2 stools daily)   Last time this was  given:  4 mg on 9/10/2018  8:48 AM                                melatonin 5 MG tablet   Take 5 mg by mouth nightly as needed for sleep   Last time this was given:  5 mg on 9/9/2018  9:31 PM                                metoprolol succinate 100 MG 24 hr tablet   Commonly known as:  TOPROL-XL   Take 100 mg by mouth daily                                MULTIVITAMIN ADULT PO   Take 1 tablet by mouth daily                                triamcinolone 0.1 % cream   Commonly known as:  KENALOG   Apply topically 2 times daily TO AFFECTED AREAS OF LEG FOR 3-4 WEEKS FOR ATOPIC DERMATITIS                                Vitamin D3 400 units Caps   Take 1 capsule by mouth daily

## 2018-09-04 NOTE — IP AVS SNAPSHOT
[address] [address]    Phone:  [phone]                                       After Visit Summary   9/4/2018    Reena Medellin    MRN: [id number]           After Visit Summary Signature Page     I have received my discharge instructions, and my questions have been answered. I have discussed any challenges I see with this plan with the nurse or doctor.    ..........................................................................................................................................  Patient/Patient Representative Signature      ..........................................................................................................................................  Patient Representative Print Name and Relationship to Patient    ..................................................               ................................................  Date                                            Time    ..........................................................................................................................................  Reviewed by Signature/Title    ...................................................              ..............................................  Date                                                            Time          22EPIC Rev 08/18

## 2018-09-05 ENCOUNTER — APPOINTMENT (OUTPATIENT)
Dept: PHYSICAL THERAPY | Facility: CLINIC | Age: 83
DRG: 394 | End: 2018-09-05
Attending: STUDENT IN AN ORGANIZED HEALTH CARE EDUCATION/TRAINING PROGRAM
Payer: MEDICARE

## 2018-09-05 PROBLEM — R19.7 DIARRHEA: Status: ACTIVE | Noted: 2018-09-05

## 2018-09-05 LAB
ANION GAP SERPL CALCULATED.3IONS-SCNC: 11 MMOL/L (ref 3–14)
ANION GAP SERPL CALCULATED.3IONS-SCNC: 16 MMOL/L (ref 3–14)
BASE DEFICIT BLDV-SCNC: 12 MMOL/L
BUN SERPL-MCNC: 81 MG/DL (ref 7–30)
BUN SERPL-MCNC: 87 MG/DL (ref 7–30)
C COLI+JEJUNI+LARI FUSA STL QL NAA+PROBE: ABNORMAL
C COLI+JEJUNI+LARI FUSA STL QL NAA+PROBE: NOT DETECTED
CALCIUM SERPL-MCNC: 7.3 MG/DL (ref 8.5–10.1)
CALCIUM SERPL-MCNC: 7.4 MG/DL (ref 8.5–10.1)
CHLORIDE SERPL-SCNC: 105 MMOL/L (ref 94–109)
CHLORIDE SERPL-SCNC: 108 MMOL/L (ref 94–109)
CO2 SERPL-SCNC: 10 MMOL/L (ref 20–32)
CO2 SERPL-SCNC: 13 MMOL/L (ref 20–32)
CREAT SERPL-MCNC: 2.22 MG/DL (ref 0.52–1.04)
CREAT SERPL-MCNC: 2.77 MG/DL (ref 0.52–1.04)
EC STX1 GENE STL QL NAA+PROBE: NOT DETECTED
EC STX2 GENE STL QL NAA+PROBE: NOT DETECTED
ENTERIC PATHOGEN COMMENT: NORMAL
GFR SERPL CREATININE-BSD FRML MDRD: 16 ML/MIN/1.7M2
GFR SERPL CREATININE-BSD FRML MDRD: 20 ML/MIN/1.7M2
GLUCOSE SERPL-MCNC: 73 MG/DL (ref 70–99)
GLUCOSE SERPL-MCNC: 99 MG/DL (ref 70–99)
HCO3 BLDV-SCNC: 14 MMOL/L (ref 21–28)
NOROV GI+II ORF1-ORF2 JNC STL QL NAA+PR: NOT DETECTED
O2/TOTAL GAS SETTING VFR VENT: 21 %
PCO2 BLDV: 33 MM HG (ref 40–50)
PH BLDV: 7.24 PH (ref 7.32–7.43)
PO2 BLDV: 33 MM HG (ref 25–47)
POTASSIUM SERPL-SCNC: 3.1 MMOL/L (ref 3.4–5.3)
POTASSIUM SERPL-SCNC: 3.8 MMOL/L (ref 3.4–5.3)
RVA NSP5 STL QL NAA+PROBE: NOT DETECTED
SALMONELLA SP RPOD STL QL NAA+PROBE: NOT DETECTED
SHIGELLA SP+EIEC IPAH STL QL NAA+PROBE: NOT DETECTED
SODIUM SERPL-SCNC: 129 MMOL/L (ref 133–144)
SODIUM SERPL-SCNC: 134 MMOL/L (ref 133–144)
V CHOL+PARA RFBL+TRKH+TNAA STL QL NAA+PR: NOT DETECTED
Y ENTERO RECN STL QL NAA+PROBE: NOT DETECTED

## 2018-09-05 PROCEDURE — 25000128 H RX IP 250 OP 636: Performed by: HOSPITALIST

## 2018-09-05 PROCEDURE — 40000141 ZZH STATISTIC PERIPHERAL IV START W/O US GUIDANCE

## 2018-09-05 PROCEDURE — A9270 NON-COVERED ITEM OR SERVICE: HCPCS | Mod: GY | Performed by: STUDENT IN AN ORGANIZED HEALTH CARE EDUCATION/TRAINING PROGRAM

## 2018-09-05 PROCEDURE — 97161 PT EVAL LOW COMPLEX 20 MIN: CPT | Mod: GP

## 2018-09-05 PROCEDURE — 25000132 ZZH RX MED GY IP 250 OP 250 PS 637: Mod: GY | Performed by: HOSPITALIST

## 2018-09-05 PROCEDURE — 25000132 ZZH RX MED GY IP 250 OP 250 PS 637: Mod: GY | Performed by: STUDENT IN AN ORGANIZED HEALTH CARE EDUCATION/TRAINING PROGRAM

## 2018-09-05 PROCEDURE — 80048 BASIC METABOLIC PNL TOTAL CA: CPT | Performed by: HOSPITALIST

## 2018-09-05 PROCEDURE — 87506 IADNA-DNA/RNA PROBE TQ 6-11: CPT | Performed by: PATHOLOGY

## 2018-09-05 PROCEDURE — 25000128 H RX IP 250 OP 636: Performed by: EMERGENCY MEDICINE

## 2018-09-05 PROCEDURE — 25000125 ZZHC RX 250: Performed by: STUDENT IN AN ORGANIZED HEALTH CARE EDUCATION/TRAINING PROGRAM

## 2018-09-05 PROCEDURE — 97116 GAIT TRAINING THERAPY: CPT | Mod: GP

## 2018-09-05 PROCEDURE — 97530 THERAPEUTIC ACTIVITIES: CPT | Mod: GP

## 2018-09-05 PROCEDURE — 25000128 H RX IP 250 OP 636: Performed by: STUDENT IN AN ORGANIZED HEALTH CARE EDUCATION/TRAINING PROGRAM

## 2018-09-05 PROCEDURE — 40000802 ZZH SITE CHECK

## 2018-09-05 PROCEDURE — 25000132 ZZH RX MED GY IP 250 OP 250 PS 637: Mod: GY | Performed by: PATHOLOGY

## 2018-09-05 PROCEDURE — A9270 NON-COVERED ITEM OR SERVICE: HCPCS | Mod: GY | Performed by: PATHOLOGY

## 2018-09-05 PROCEDURE — 80048 BASIC METABOLIC PNL TOTAL CA: CPT | Performed by: STUDENT IN AN ORGANIZED HEALTH CARE EDUCATION/TRAINING PROGRAM

## 2018-09-05 PROCEDURE — A9270 NON-COVERED ITEM OR SERVICE: HCPCS | Mod: GY | Performed by: HOSPITALIST

## 2018-09-05 PROCEDURE — 36415 COLL VENOUS BLD VENIPUNCTURE: CPT | Performed by: STUDENT IN AN ORGANIZED HEALTH CARE EDUCATION/TRAINING PROGRAM

## 2018-09-05 PROCEDURE — 40000193 ZZH STATISTIC PT WARD VISIT

## 2018-09-05 PROCEDURE — 40000559 ZZH STATISTIC FAILED PERIPHERAL IV START

## 2018-09-05 PROCEDURE — 36415 COLL VENOUS BLD VENIPUNCTURE: CPT | Performed by: HOSPITALIST

## 2018-09-05 PROCEDURE — 40000982 ZZH STATISTICAL HAIKU-CANTO PHOTO

## 2018-09-05 PROCEDURE — 82803 BLOOD GASES ANY COMBINATION: CPT | Performed by: STUDENT IN AN ORGANIZED HEALTH CARE EDUCATION/TRAINING PROGRAM

## 2018-09-05 PROCEDURE — 12000001 ZZH R&B MED SURG/OB UMMC

## 2018-09-05 PROCEDURE — 25000128 H RX IP 250 OP 636

## 2018-09-05 PROCEDURE — 97110 THERAPEUTIC EXERCISES: CPT | Mod: GP

## 2018-09-05 PROCEDURE — 99233 SBSQ HOSP IP/OBS HIGH 50: CPT | Mod: GC | Performed by: INTERNAL MEDICINE

## 2018-09-05 RX ORDER — POTASSIUM CHLORIDE 750 MG/1
20 TABLET, EXTENDED RELEASE ORAL DAILY
Status: DISCONTINUED | OUTPATIENT
Start: 2018-09-05 | End: 2018-09-10 | Stop reason: HOSPADM

## 2018-09-05 RX ORDER — POTASSIUM CHLORIDE 750 MG/1
20 TABLET, EXTENDED RELEASE ORAL DAILY
Status: DISCONTINUED | OUTPATIENT
Start: 2018-09-05 | End: 2018-09-05

## 2018-09-05 RX ORDER — SODIUM BICARBONATE 650 MG/1
650 TABLET ORAL 2 TIMES DAILY
Status: DISCONTINUED | OUTPATIENT
Start: 2018-09-05 | End: 2018-09-05

## 2018-09-05 RX ORDER — CEFEPIME HYDROCHLORIDE 1 G/1
1 INJECTION, POWDER, FOR SOLUTION INTRAMUSCULAR; INTRAVENOUS EVERY 24 HOURS
Status: DISCONTINUED | OUTPATIENT
Start: 2018-09-05 | End: 2018-09-08

## 2018-09-05 RX ORDER — NALOXONE HYDROCHLORIDE 0.4 MG/ML
.1-.4 INJECTION, SOLUTION INTRAMUSCULAR; INTRAVENOUS; SUBCUTANEOUS
Status: DISCONTINUED | OUTPATIENT
Start: 2018-09-05 | End: 2018-09-10 | Stop reason: HOSPADM

## 2018-09-05 RX ORDER — SODIUM BICARBONATE 650 MG/1
650 TABLET ORAL ONCE
Status: COMPLETED | OUTPATIENT
Start: 2018-09-05 | End: 2018-09-05

## 2018-09-05 RX ORDER — LOPERAMIDE HCL 2 MG
2 CAPSULE ORAL 3 TIMES DAILY PRN
Status: DISCONTINUED | OUTPATIENT
Start: 2018-09-05 | End: 2018-09-10 | Stop reason: HOSPADM

## 2018-09-05 RX ORDER — LOPERAMIDE HCL 2 MG
2 CAPSULE ORAL 2 TIMES DAILY
Status: DISCONTINUED | OUTPATIENT
Start: 2018-09-05 | End: 2018-09-06

## 2018-09-05 RX ORDER — ASPIRIN 81 MG/1
81 TABLET ORAL DAILY
Status: DISCONTINUED | OUTPATIENT
Start: 2018-09-05 | End: 2018-09-10 | Stop reason: HOSPADM

## 2018-09-05 RX ORDER — ACETAMINOPHEN 500 MG
1000 TABLET ORAL EVERY 8 HOURS PRN
Status: DISCONTINUED | OUTPATIENT
Start: 2018-09-05 | End: 2018-09-10 | Stop reason: HOSPADM

## 2018-09-05 RX ADMIN — POTASSIUM CHLORIDE 20 MEQ: 750 TABLET, EXTENDED RELEASE ORAL at 07:54

## 2018-09-05 RX ADMIN — CHOLECALCIFEROL TAB 10 MCG (400 UNIT) 400 UNITS: 10 TAB at 12:40

## 2018-09-05 RX ADMIN — ASPIRIN 81 MG: 81 TABLET, COATED ORAL at 07:54

## 2018-09-05 RX ADMIN — VANCOMYCIN HYDROCHLORIDE 1500 MG: 10 INJECTION, POWDER, LYOPHILIZED, FOR SOLUTION INTRAVENOUS at 09:45

## 2018-09-05 RX ADMIN — SODIUM BICARBONATE 650 MG TABLET 650 MG: at 18:17

## 2018-09-05 RX ADMIN — CEFEPIME HYDROCHLORIDE 1 G: 1 INJECTION, POWDER, FOR SOLUTION INTRAMUSCULAR; INTRAVENOUS at 08:54

## 2018-09-05 RX ADMIN — SODIUM BICARBONATE: 84 INJECTION, SOLUTION INTRAVENOUS at 18:17

## 2018-09-05 RX ADMIN — POTASSIUM CHLORIDE 10 MEQ: 10 INJECTION, SOLUTION INTRAVENOUS at 02:14

## 2018-09-05 ASSESSMENT — ACTIVITIES OF DAILY LIVING (ADL)
ADLS_ACUITY_SCORE: 16
ADLS_ACUITY_SCORE: 15
ADLS_ACUITY_SCORE: 15

## 2018-09-05 NOTE — PLAN OF CARE
Problem: Patient Care Overview  Goal: Plan of Care/Patient Progress Review  Pt admitted from ED around 0100. A&Ox4.  VSS on RA. Up w A1 and walker to BS commode. Denies pain. Multiple loose stools overnight. Stool sample sent. Voided x2. Bladder scan at 0500- 243 ml. Perineum area-excoritated and raw, barrier cream applied. Pt had scant amount of blood on wipes this AM after using the commode-MD notified. Pt was supposed to get 20 mEq of K during this shift. However, IV K infiltrated on R arm, extravasation protocol started,  R arm marked. Pt did not want to be hooked back onto IV after incident. MD came and talked to pt but pt still refused. All meds were postponed till AM so pt could sleep-MD aware.  Bed alarm on for safety. Will cont to monitor.

## 2018-09-05 NOTE — PLAN OF CARE
Problem: Patient Care Overview  Goal: Plan of Care/Patient Progress Review  Discharge Planner PT  - 5A - PT evaluation completed, treatment initiated.  Patient plan for discharge: Pt would like to dc back to group home.  Current status: Pt completes supine <> sit with SBA. Pt transfers sit <> stand with CGA and FWW. Pt ambulates ~80ft total with FWW + CGA. Pt with decreased jose guadalupe/step length, forward kyphotic posture, no overt LOB. Pt participates in seated LE therex.  Barriers to return to prior living situation: Medical status, deconditioning, activity tolerance.  Recommendations for discharge: Anticipate with continued progress in therapy, pt will be able to dc back to [facility] with HH PT once medically appropriate. Recommend pt use FWW for safety and improved stability vs. pt's personal 4WW.

## 2018-09-05 NOTE — PROGRESS NOTES
[facility], [place]    Internal Medicine Progress Note - [facility] Service    Today  - Continue cefepime, discontinue vancomycin    - PT/OT evaluation  - Trend BMP to ensure appropriate correction of electrolytes  - Follow up urine cx  - C. Diff and enteric panel negative, started on loperamide     Assessment & Plan   Reena Medellin is 89 year old female with a PMHx significant for osteoporosis, CKD, HTN, and renal cancer s/p right nephrectomy and recurrent cystitis, presenting with acute cystitis and diarrhea after failing several courses of outpatient antibiotic therapy. Also found to have an MEME, hypokalemia and hyponatremia on admission.      # Acute cystitis  Patient with a history of recurrent cystitis presenting with ongoing urinary retention/ urgency/ frequency x1.5 months. Has been evaluated 3 times in the past few months for similar symptoms with treatment including Cipro, Cefdinir, and more recently Levaquin/amoxicillin on 8/14. UA on admission consistent with cystitis with UC pending.  No clinical signs of pyelonephritis:  CVA tenderness, fever, flank pain.  Was hypotensive on admission but lactic acid wnl. Recurrent cystitis likely 2/2 urinary retention. Renal US (s/p right nephrectomy 2/2 to renal cancer) negative for hydronephrosis.  - Discontinue vancomycin and continue cefepime   - Follow up urine cx and abx susceptibilities      # Hyponatremia  Na 127 upon presentation. Differential includes hypovolemic hyponatremia (diarrhea, decrease PO), SIADH vs combination of the two. Na initially improved after initiation of IV normal saline but IV access lost during the day on 06/05. Na lower on recheck.  - Will restart maintenance IVF when IV access reestablished. In the mean time, encouraging increased PO intake.   - Trending BMP     # Hypokalemia  Potassium of 3.2 on admission.  Likely 2/2 MEME in the setting of CKD.   - S/p KCl 30 mEq x1 on admission  - Will replete as needed     #  MEME  # Anion Gap Metabolic Acidosis  Cr 3.98 upon presentation.  Hx of CKD.  However, baseline appears to be around 0.9-1.1.  Suspect prerenal in etiology given recent diarrhea, decreased oral intake and hypotension on presentation. Renal ultrasound negative for obstruction. Cautious regarding fluid resuscitation in the setting of hyponatremia and potential SIADH.    - IVF as above   - Trend BMP     # Diarrhea  Increased diarrhea for 2-3 days prior to admission. C. Diff negative.  Likely antibiotic-associated diarrhea in the setting of recent antibiotic use. No recent sick contacts, travel, nor new foods. Received one dose of IV Flagyl in the ED.    - C. Diff negative  - Enteric panel negative  - Loperamide 2 mg scheduled + PRN     # Recent Fall with scalp laceration (8/6)  Evaluated at OSH.  CT cervical spine and head unremarkable.  No sequale post fall and has not had any other episodes since.  ? Deconditioning component in setting of advanced age.  - PT/ OT evaluation            Chronic Problems  Osteoporosis:  Continue PTA Vitamin D    # Pain Assessment:   Reena mitchell pain level was assessed and she currently denies pain.      Diet: Combination Diet Regular Diet Adult  Fluids: NS + 50mEq/L sodium bicarb @ 100 ml/hr   DVT Prophylaxis: Pneumatic Compression Devices  Code Status: DNR / DNI    Disposition Plan   Expected discharge: 2 - 3 days, recommended to prior living arrangement once vitals stable and adequate treatment plan implemented for cystis .     Entered: Myra Valle 09/05/2018, 11:39 AM   Information in the above section will display in the discharge planner report.      The patient's care was discussed with the Attending Physician, Dr. Salguero .    Myra Valle  [facility]: 1  Pager: [phone]  Please see sticky note for cross cover information    Interval History   Nursing notes reviewed, no acute events overnight. Continues to have diarrhea, urinary  urgency/retention this AM. No subjective fevers, chills, chest pain, shortness of breath or headache. Tolerating PO intake.     Physical Exam   Vital Signs: Temp: 96.8  F (36  C) Temp src: Oral BP: 138/51 Pulse: 89   Resp: 18 SpO2: 97 % O2 Device: None (Room air)    Weight: 150 lbs 0 oz  General Appearance: Well appearing.  Resting comfortably in bed.  No acute distress.     HEENT: Two well healed excoriations on crown of head (from previous fall). PERRL, MMM. Respiratory: Breathing comfortably on room air. Lungs CTAB, no crackles, rhonchi or wheezes  Cardiovascular: RRR.  No murmur, gallops, or rubs.  GI: Soft, non-distended. Mild suprapubic tenderness. No rebound or guarding. Normoactive BS.   Skin: No concerning lesions     Back: No CVA tenderness  Neurologic: AOx3, no focal motor or sensory deficits.  Psychiatric: Normal mood and affect.      Data   Medications     IV infusion builder WITH additives 100 mL/hr at 09/05/18 1817       aspirin  81 mg Oral Daily     ceFEPIme (MAXIPIME) IV  1 g Intravenous Q24H     cholecalciferol  400 Units Oral Daily     loperamide  2 mg Oral BID     potassium chloride  20 mEq Oral Daily     Data     Recent Labs  Lab 09/05/18  1327 09/05/18  0725 09/04/18  2300 09/04/18  1714   WBC  --   --   --  6.0   HGB  --   --   --  14.0   MCV  --   --   --  90   PLT  --   --   --  229   * 134 132* 127*   POTASSIUM 3.8 3.1* 2.9* 3.2*   CHLORIDE 108 105 101 93*   CO2 10* 13* 16* 16*   BUN 81* 87* 88* 92*   CR 2.22* 2.77* 3.40* 3.98*   ANIONGAP 11 16* 16* 18*   SABRINA 7.4* 7.3* 7.1* 8.0*   GLC 99 73 88 93   ALBUMIN  --   --   --  3.9   PROTTOTAL  --   --   --  7.9   BILITOTAL  --   --   --  0.4   ALKPHOS  --   --   --  122   ALT  --   --   --  17   AST  --   --   --  20     Recent Results (from the past 24 hour(s))   US Renal Complete    Narrative    EXAMINATION: US RENAL COMPLETE, 9/5/2018 12:19 AM     COMPARISON: None.    HISTORY: History of recurrent UTIs. Concern for  hydronephrosis.    FINDINGS:    Right kidney: The right kidney is surgically absent. The right renal  fossa is unremarkable.    Left kidney: Measures 10.6 cm in length. Parenchyma is of normal  thickness and echogenicity. No focal mass. No hydronephrosis.     Bladder: Unremarkable.        Impression    IMPRESSION:   1. Unremarkable ultrasound of the left kidney. No evidence of  hydronephrosis.  2. The right kidney is surgically absent.    I have personally reviewed the examination and initial interpretation  and I agree with the findings.    ELADIO HINES MD       Pt was seen and examined by me; confirmed abdominal exam findings, reviewed labs, vitals, imaging.  I agree with the detailed A/P documentation above.  Continue antibiotics for UTI, monitor for urinary retention, consult PT/OT.  Creatinine improving with IVF; suspect prerenal from infection plus recent diarrheal illness.  Supplement NS with bicarb to correct acidosis.      Delicia Salguero MD

## 2018-09-05 NOTE — PROGRESS NOTES
Care Coordinator Progress Note    Admission Date/Time:  9/4/2018  Attending MD:  Delicia Pettit*    Data  Chart reviewed, discussed with interdisciplinary team.   Patient was admitted for:    Clostridium difficile diarrhea  Diarrhea, unspecified type  Acute kidney injury (H).    Concerns with insurance coverage for discharge needs: None.  Current Living Situation: Patient lives in an assisted living facility.  Support System: Supportive and Involved  Services Involved: Home Care  Transportation at Discharge: Family or friend will provide  Transportation to Medical Appointments:  Family or friends  Barriers to Discharge: medical readiness    Coordination of Care and Referrals: Provided patient/family with options for discharge planning     Assessment  Patient admitted with urinary retention and frequency. History of osteoporosis, CKD, HTN, and renal cancer s/p right nephrectomy.   Met with patient at bedside to discuss discharge planning and needs. Patient resides at [facility] in [place] (ph: [phone]). Spoke with both patient and daughter in law Arora via phone about cares. [facility] provides meals and medication management/administration. Open to [facility] (Ph: [phone] Fax: [phone]) for RN, PT, OT, and HA services; resumption orders placed and agency called to update on inpatient status. Patients family or friends assist with transportation to appointments.  Call placed to [facility] multiple times with no answer or no option to leave a message. Verified phone number with [facility]. Writer to continue to try to speak to [facility] staff.       Plan  Anticipated Discharge Date:  TBD  Anticipated Discharge Plan:  TBD; awaiting therapy recs    Fabiana Haynes RN

## 2018-09-05 NOTE — PLAN OF CARE
Problem: Patient Care Overview  Goal: Plan of Care/Patient Progress Review  OT/5A: OT orders received and acknowledged. Per discussion with Physical Therapist, patient currently lives in assisted living facility and receives support for bathing and dressing tasks. Functional mobility transfers being addressed by PT. No acute OT needs indicated at this time and will defer to PT for discharge/therapy recommendations.

## 2018-09-05 NOTE — ED NOTES
[facility], [place]   ED Nurse to Floor Handoff     Reena Medellin is a 89 year old female who speaks English and lives alone,  in a nursing home  They arrived in the ED by ambulance from clinic    ED Chief Complaint: Hypotension and Diarrhea    ED Dx;   Final diagnoses:   Clostridium difficile diarrhea - Suspected   Diarrhea, unspecified type   Acute kidney injury (H)         Needed?: No    Allergies:   Allergies   Allergen Reactions     Sulfa Drugs    .  Past Medical Hx: No past medical history on file.   Baseline Mental status: mild dementia  Current Mental Status changes: at basesline    Infection present or suspected this encounter: yes enteric  Sepsis suspected: No  Isolation type: Enteric     Activity level - Baseline/Home:  Stand with Assist  Activity Level - Current:   Stand with Assist    Bariatric equipment needed?: No    In the ED these meds were given:   Medications   sodium chloride 0.9% infusion ( Intravenous Stopped 9/4/18 1831)   0.9% sodium chloride BOLUS (1,000 mLs Intravenous New Bag 9/4/18 1951)     Followed by   sodium chloride 0.9% infusion (not administered)   metroNIDAZOLE (FLAGYL) infusion 500 mg (500 mg Intravenous New Bag 9/4/18 1950)   0.9% sodium chloride BOLUS (0 mLs Intravenous Stopped 9/4/18 1949)     Followed by   sodium chloride 0.9% infusion (not administered)       Drips running?  No    Home pump  No    Current LDAs  Peripheral IV 09/04/18 Left Lower forearm (Active)   Site Assessment [facility] 9/4/2018  5:27 PM   Number of days:0       Peripheral IV 09/04/18 Left Lower forearm (Active)   Site Assessment [facility] 9/4/2018  7:17 PM   Number of days:0       Labs results:   Labs Ordered and Resulted from Time of ED Arrival Up to the Time of Departure from the ED   COMPREHENSIVE METABOLIC PANEL - Abnormal; Notable for the following:        Result Value    Sodium 127 (*)     Potassium 3.2 (*)     Chloride 93 (*)     Carbon Dioxide 16 (*)     Anion Gap 18 (*)      Urea Nitrogen 92 (*)     Creatinine 3.98 (*)     GFR Estimate 10 (*)     GFR Estimate If Black 13 (*)     Calcium 8.0 (*)     All other components within normal limits   CBC WITH PLATELETS DIFFERENTIAL   UA MACROSCOPIC WITH REFLEX TO MICRO AND CULTURE   LACTIC ACID WHOLE BLOOD   CHLORIDE RANDOM URINE   POTASSIUM RANDOM URINE   SODIUM RANDOM URINE   OSMOLALITY URINE   OSMOLALITY   CLOSTRIDIUM DIFFICILE TOXIN B   BLOOD CULTURE   BLOOD CULTURE       Imaging Studies: No results found for this or any previous visit (from the past 24 hour(s)).    Recent vital signs:   BP 96/65  Pulse 72  Temp 97  F (36.1  C) (Oral)  Resp 18  SpO2 97%     Code Status:     Pain control: good    Nausea control: good    Abnormal labs/tests/findings requiring intervention: see results    Family present during ED course? Yes   Family Comments/Social Situation comments:     Tasks needing completion: None    Kristin Christianson RN  [facility]--   [phone] [place] [facility]  [phone] [facility]

## 2018-09-05 NOTE — PHARMACY-VANCOMYCIN DOSING SERVICE
Pharmacy Vancomycin Initial Note  Date of Service 2018  Patient's  1921  89 year old, female    Indication: Urinary Tract Infection    Current estimated CrCl = CrCl cannot be calculated (Unknown ideal weight.).    Creatinine for last 3 days  2018:  5:14 PM Creatinine 3.98 mg/dL; 11:00 PM Creatinine 3.40 mg/dL    Recent Vancomycin Level(s) for last 3 days  No results found for requested labs within last 72 hours.      Vancomycin IV Administrations (past 72 hours)      No vancomycin orders with administrations in past 72 hours.                Nephrotoxins and other renal medications (Future)    Start     Dose/Rate Route Frequency Ordered Stop    18  vancomycin (VANCOCIN) 1,500 mg in sodium chloride 0.9 % 250 mL intermittent infusion      1,500 mg  over 90 Minutes Intravenous ONCE 18 0434      18 0434  vancomycin place [email or web address] - receiving intermittent dosing      1 each Does not apply SEE ADMIN INSTRUCTIONS 18 043            Contrast Orders - past 72 hours     None                Plan:  1.  Start vancomycin  1500 mg IV X1 followed by intermittent dosing based on levels.   2.  Goal Trough Level: 15-20 mg/L   3.  Pharmacy will check trough levels as appropriate in 1-3 Days.    4. Serum creatinine levels will be ordered daily for the first week of therapy and at least twice weekly for subsequent weeks.    5. [place] method utilized to dose vancomycin therapy: Method 1    Orville Oconnell

## 2018-09-05 NOTE — PROGRESS NOTES
09/05/18 0924   Quick Adds   Type of Visit Initial PT Evaluation   Living Environment   Lives With facility resident   Living Arrangements assisted living   Home Accessibility no concerns   Number of Stairs to Enter Home 0   Number of Stairs Within Home 0  (Utilizes elevator.)   Transportation Available family or friend will provide   Living Environment Comment Pt goes to dining room for meals.   Self-Care   Usual Activity Tolerance moderate   Current Activity Tolerance fair   Regular Exercise no   Equipment Currently Used at Home grab bar;raised toilet;shower chair;walker, rolling  (4WW.)   Activity/Exercise/Self-Care Comment Pt reports she receives assistance for showering. Pt has a walk-in shower with shower chair and grab bars. Pt reports occasional need for assistance with dressing.    Functional Level Prior   Ambulation 1-->assistive equipment  (4WW.)   Transferring 1-->assistive equipment  (4WW.)   Toileting 1-->assistive equipment  (Raised toilet.)   Bathing 3-->assistive equipment and person  (Shower chair and GB.)   Dressing 0-->independent  (Occasional assistance from staff.)   Eating 0-->independent   Communication 0-->understands/communicates without difficulty   Swallowing 0-->swallows foods/liquids without difficulty   Cognition 0 - no cognition issues reported   Fall history within last six months yes   Number of times patient has fallen within last six months 1   Which of the above functional risks had a recent onset or change? ambulation;transferring;fall history   Prior Functional Level Comment Pt utilizes 4WW for mobility at baseline, states she also owns a FWW. Pt reports she had no difficulties ambulating around custodial.   General Information   Onset of Illness/Injury or Date of Surgery - Date 09/04/18   Referring Physician Karli Ryan    Patient/Family Goals Statement Pt would like to return home.   Pertinent History of Current Problem (include personal factors and/or comorbidities that impact  the POC) Reena Medellin is a 89 year old female with a PMHx significant for osteoporosis, CKD, HTN, and renal cancer s/p right nephrectomy, who presents with complaints of urinary retention and frequency.   Precautions/Limitations fall precautions   General Observations Upon arrival, pt seated on commode and agreeable to PT session. NST present.   General Info Comments Activity - up with assist.   Cognitive Status Examination   Orientation orientation to person, place and time   Level of Consciousness alert   Follows Commands and Answers Questions 100% of the time   Personal Safety and Judgment intact   Cognitive Comment Per chart, mild dementia at baseline. Pt perseverating on letting family know she is in the hospital and 'who will pay for this visit' throughout session - redirection required to focus on tasks at hand.   Pain Assessment   Patient Currently in Pain Yes, see Vital Sign flowsheet  (Abdominal discomfort.)   Posture    Posture Protracted shoulders;Forward head position;Kyphosis   Range of Motion (ROM)   ROM Comment B LE ROM WFL.   Strength   Strength Comments B hip strength at least 3+/5 to 4/5, remaining B LE strength at least 4/5 to 4+/5 - based on functional mobility assessement.   Bed Mobility   Bed Mobility Comments Pt completes supine <> sit with SBA.    Transfer Skills   Transfer Comments Pt transfers sit <> stand with CGA and FWW.    Gait   Gait Comments Pt ambulates ~80ft total with FWW + CGA. Pt with decreased jose guadalupe/step length, forward kyphotic posture, no overt LOB.   Balance   Balance Comments Impaired - requires FWW for standing balance.   General Therapy Interventions   Planned Therapy Interventions balance training;bed mobility training;gait training;strengthening;transfer training;home program guidelines;progressive activity/exercise   Clinical Impression   Criteria for Skilled Therapeutic Intervention yes, treatment indicated   PT Diagnosis Impaired functional mobility.   Influenced  "by the following impairments LE weakness, impaired balance, decreased activity tolerance, pain.   Functional limitations due to impairments Bed mobility, transfers, gait.   Clinical Presentation Stable/Uncomplicated   Clinical Presentation Rationale Clinical judgement.   Clinical Decision Making (Complexity) Low complexity   Therapy Frequency` 5 times/week   Predicted Duration of Therapy Intervention (days/wks) 1 week.   Anticipated Equipment Needs at Discharge (None anticipated.)   Anticipated Discharge Disposition Other (see comments)  (DC back to [facility] with HH PT.)   Risk & Benefits of therapy have been explained Yes   Patient, Family & other staff in agreement with plan of care Yes   [facility] [employer] \"6 Clicks\"   2016, Trustees of [facility], under license to [employer].  All rights reserved.   6 Clicks Short Forms Basic Mobility Inpatient Short Form   [facility] [employer]  \"6 Clicks\" V.2 Basic Mobility Inpatient Short Form   1. Turning from your back to your side while in a flat bed without using bedrails? 4 - None   2. Moving from lying on your back to sitting on the side of a flat bed without using bedrails? 4 - None   3. Moving to and from a bed to a chair (including a wheelchair)? 3 - A Little   4. Standing up from a chair using your arms (e.g., wheelchair, or bedside chair)? 3 - A Little   5. To walk in hospital room? 3 - A Little   6. Climbing 3-5 steps with a railing? 3 - A Little   Basic Mobility Raw Score (Score out of 24.Lower scores equate to lower levels of function) 20   Total Evaluation Time   Total Evaluation Time (Minutes) 8     "

## 2018-09-05 NOTE — PLAN OF CARE
Problem: Patient Care Overview  Goal: Plan of Care/Patient Progress Review  Outcome: No Change  Time  [id number]  Pt alert and oriented, VSS on RA. Patient now DNR/DNI status. Patient reported pain, urgency with voiding. Straight cathed for 400 mL urine. L PIVs x2 extravasated while running Vanco. Vascular consulted, ice packs placed.  Continuous NaCl +KCl to be restarted after PIV is placed. Minimal oral intake this shift. Watery, loose stools continue, C. Diff negative. Discontinued enteric precautions. Potassium trending up to 3.8. Sodium down to 129. Creatinine trending down. Continues on IV antibiotics cefepime and vanco. Will continue to monitor and follow POC.

## 2018-09-06 LAB
ANION GAP SERPL CALCULATED.3IONS-SCNC: 14 MMOL/L (ref 3–14)
BASOPHILS # BLD AUTO: 0 10E9/L (ref 0–0.2)
BASOPHILS NFR BLD AUTO: 0.4 %
BUN SERPL-MCNC: 63 MG/DL (ref 7–30)
CALCIUM SERPL-MCNC: 7.9 MG/DL (ref 8.5–10.1)
CHLORIDE SERPL-SCNC: 113 MMOL/L (ref 94–109)
CO2 SERPL-SCNC: 17 MMOL/L (ref 20–32)
CREAT SERPL-MCNC: 1.45 MG/DL (ref 0.52–1.04)
DIFFERENTIAL METHOD BLD: ABNORMAL
EOSINOPHIL # BLD AUTO: 0.1 10E9/L (ref 0–0.7)
EOSINOPHIL NFR BLD AUTO: 1.1 %
ERYTHROCYTE [DISTWIDTH] IN BLOOD BY AUTOMATED COUNT: 13.9 % (ref 10–15)
GFR SERPL CREATININE-BSD FRML MDRD: 33 ML/MIN/1.7M2
GLUCOSE SERPL-MCNC: 84 MG/DL (ref 70–99)
HCT VFR BLD AUTO: 33.7 % (ref 35–47)
HGB BLD-MCNC: 11.8 G/DL (ref 11.7–15.7)
IMM GRANULOCYTES # BLD: 0 10E9/L (ref 0–0.4)
IMM GRANULOCYTES NFR BLD: 0.4 %
LYMPHOCYTES # BLD AUTO: 1.1 10E9/L (ref 0.8–5.3)
LYMPHOCYTES NFR BLD AUTO: 20.5 %
MCH RBC QN AUTO: 31.1 PG (ref 26.5–33)
MCHC RBC AUTO-ENTMCNC: 35 G/DL (ref 31.5–36.5)
MCV RBC AUTO: 89 FL (ref 78–100)
MONOCYTES # BLD AUTO: 0.6 10E9/L (ref 0–1.3)
MONOCYTES NFR BLD AUTO: 10.6 %
NEUTROPHILS # BLD AUTO: 3.5 10E9/L (ref 1.6–8.3)
NEUTROPHILS NFR BLD AUTO: 67 %
NRBC # BLD AUTO: 0 10*3/UL
NRBC BLD AUTO-RTO: 0 /100
PLATELET # BLD AUTO: 170 10E9/L (ref 150–450)
POTASSIUM SERPL-SCNC: 3 MMOL/L (ref 3.4–5.3)
RBC # BLD AUTO: 3.79 10E12/L (ref 3.8–5.2)
SODIUM SERPL-SCNC: 145 MMOL/L (ref 133–144)
WBC # BLD AUTO: 5.3 10E9/L (ref 4–11)

## 2018-09-06 PROCEDURE — 25000128 H RX IP 250 OP 636: Performed by: HOSPITALIST

## 2018-09-06 PROCEDURE — 25000132 ZZH RX MED GY IP 250 OP 250 PS 637: Mod: GY | Performed by: STUDENT IN AN ORGANIZED HEALTH CARE EDUCATION/TRAINING PROGRAM

## 2018-09-06 PROCEDURE — A9270 NON-COVERED ITEM OR SERVICE: HCPCS | Mod: GY | Performed by: STUDENT IN AN ORGANIZED HEALTH CARE EDUCATION/TRAINING PROGRAM

## 2018-09-06 PROCEDURE — A9270 NON-COVERED ITEM OR SERVICE: HCPCS | Mod: GY | Performed by: PATHOLOGY

## 2018-09-06 PROCEDURE — 36415 COLL VENOUS BLD VENIPUNCTURE: CPT | Performed by: STUDENT IN AN ORGANIZED HEALTH CARE EDUCATION/TRAINING PROGRAM

## 2018-09-06 PROCEDURE — 12000001 ZZH R&B MED SURG/OB UMMC

## 2018-09-06 PROCEDURE — 99233 SBSQ HOSP IP/OBS HIGH 50: CPT | Mod: GC | Performed by: INTERNAL MEDICINE

## 2018-09-06 PROCEDURE — 85025 COMPLETE CBC W/AUTO DIFF WBC: CPT | Performed by: STUDENT IN AN ORGANIZED HEALTH CARE EDUCATION/TRAINING PROGRAM

## 2018-09-06 PROCEDURE — 25000132 ZZH RX MED GY IP 250 OP 250 PS 637: Mod: GY | Performed by: PATHOLOGY

## 2018-09-06 PROCEDURE — 25000132 ZZH RX MED GY IP 250 OP 250 PS 637: Mod: GY | Performed by: HOSPITALIST

## 2018-09-06 PROCEDURE — A9270 NON-COVERED ITEM OR SERVICE: HCPCS | Mod: GY | Performed by: HOSPITALIST

## 2018-09-06 PROCEDURE — 25000125 ZZHC RX 250: Performed by: STUDENT IN AN ORGANIZED HEALTH CARE EDUCATION/TRAINING PROGRAM

## 2018-09-06 PROCEDURE — 25000128 H RX IP 250 OP 636: Performed by: STUDENT IN AN ORGANIZED HEALTH CARE EDUCATION/TRAINING PROGRAM

## 2018-09-06 PROCEDURE — 80048 BASIC METABOLIC PNL TOTAL CA: CPT | Performed by: STUDENT IN AN ORGANIZED HEALTH CARE EDUCATION/TRAINING PROGRAM

## 2018-09-06 RX ORDER — MENTHOL AND ZINC OXIDE .44; 20.625 G/100G; G/100G
OINTMENT TOPICAL 2 TIMES DAILY PRN
Status: DISCONTINUED | OUTPATIENT
Start: 2018-09-06 | End: 2018-09-10 | Stop reason: HOSPADM

## 2018-09-06 RX ORDER — POTASSIUM CHLORIDE 750 MG/1
40 TABLET, EXTENDED RELEASE ORAL ONCE
Status: COMPLETED | OUTPATIENT
Start: 2018-09-06 | End: 2018-09-06

## 2018-09-06 RX ORDER — CALAMINE, MENTHOL, WHITE PETROLATUM, ZINC OXIDE .5; .2; 69; 19.5 G/100G; G/100G; G/100G; G/100G
PASTE TOPICAL 2 TIMES DAILY PRN
Status: DISCONTINUED | OUTPATIENT
Start: 2018-09-06 | End: 2018-09-06 | Stop reason: CLARIF

## 2018-09-06 RX ORDER — LOPERAMIDE HCL 2 MG
4 CAPSULE ORAL EVERY EVENING
Status: DISCONTINUED | OUTPATIENT
Start: 2018-09-06 | End: 2018-09-10 | Stop reason: HOSPADM

## 2018-09-06 RX ORDER — LOPERAMIDE HCL 2 MG
2 CAPSULE ORAL EVERY MORNING
Status: DISCONTINUED | OUTPATIENT
Start: 2018-09-07 | End: 2018-09-07

## 2018-09-06 RX ADMIN — LOPERAMIDE HYDROCHLORIDE 2 MG: 2 CAPSULE ORAL at 23:05

## 2018-09-06 RX ADMIN — POTASSIUM CHLORIDE 40 MEQ: 750 TABLET, EXTENDED RELEASE ORAL at 12:29

## 2018-09-06 RX ADMIN — CEFEPIME HYDROCHLORIDE 1 G: 1 INJECTION, POWDER, FOR SOLUTION INTRAMUSCULAR; INTRAVENOUS at 06:29

## 2018-09-06 RX ADMIN — POTASSIUM CHLORIDE 20 MEQ: 750 TABLET, EXTENDED RELEASE ORAL at 09:58

## 2018-09-06 RX ADMIN — ASPIRIN 81 MG: 81 TABLET, COATED ORAL at 09:58

## 2018-09-06 RX ADMIN — CHOLECALCIFEROL TAB 10 MCG (400 UNIT) 400 UNITS: 10 TAB at 09:58

## 2018-09-06 RX ADMIN — LOPERAMIDE HYDROCHLORIDE 2 MG: 2 CAPSULE ORAL at 09:59

## 2018-09-06 RX ADMIN — LOPERAMIDE HYDROCHLORIDE 4 MG: 2 CAPSULE ORAL at 20:07

## 2018-09-06 RX ADMIN — SODIUM BICARBONATE: 84 INJECTION, SOLUTION INTRAVENOUS at 04:15

## 2018-09-06 ASSESSMENT — ACTIVITIES OF DAILY LIVING (ADL)
ADLS_ACUITY_SCORE: 16

## 2018-09-06 NOTE — PLAN OF CARE
Problem: Patient Care Overview  Goal: Plan of Care/Patient Progress Review  PT 5A: Cancel - Pt soundly sleeping upon attempt this morning. Once able to wake patient, she declined activity. Will check back as schedule allows.

## 2018-09-06 NOTE — PLAN OF CARE
Problem: Patient Care Overview  Goal: Plan of Care/Patient Progress Review  Outcome: No Change  [id number]    Pt A&O x4. VSS on RA. Bed alarm on for safety, not calling appropriately d/t frequent BMs. Up Ax1 with walker. L PIV infusing NS w/ sodium bicarb at 100 mL/hr. R infiltration site unchanged and marked. L extravasation site unchanged and marked as well. Frequent watery stools-refused PM dose of imodium. Voiding WDL. Regular diet with moderate appetite. Receiving IV cefepime for acute cystitis. Refused PM lab draw. Discharge to home in 2-3 days when antibiotic plan established. Will continue to monitor and follow POC.

## 2018-09-06 NOTE — PHARMACY-ADMISSION MEDICATION HISTORY
Pharmacy Admission Medication History    Admission medication history interview status for the 9/4/2018 admission is complete.   See EPIC admission navigator for allergy information, prior to admission medications and immunization status.   Medication history interview source(s): nONE  Medication history resources (including written lists, pill bottles, clinic record): MAR from [facility]   Medication history source reliability: Good  Pneumococcal and influenza vaccine history documented: no    Actions taken by pharmacist (provider contacted, medication changes, etc):None     Changes made to medication history:    Added to medication list: Calazime skin protectant     Additional medication history information: None    Medication reconciliation/reorder completed by provider prior to medication history? Yes    Time spent in this activity: 30 minutes    Prior to Admission medications    Medication Sig Last Dose Taking? Auth Provider   aspirin 81 MG EC tablet Take 81 mg by mouth daily 9/3/2018 Yes Unknown, Entered By History   Cholecalciferol (VITAMIN D3) 400 units CAPS Take 1 capsule by mouth daily 9/3/2018 Yes Unknown, Entered By History   melatonin 5 MG tablet Take 5 mg by mouth nightly as needed for sleep 9/3/2018 Yes Unknown, Entered By History   metoprolol succinate (TOPROL-XL) 100 MG 24 hr tablet Take 100 mg by mouth daily 9/3/2018 Yes Unknown, Entered By History   Multiple Vitamins-Minerals (MULTIVITAMIN ADULT PO) Take 1 tablet by mouth daily 9/3/2018 Yes Unknown, Entered By History   acetaminophen (TYLENOL) 500 MG tablet Take 1,000 mg by mouth every 8 hours as needed for mild pain Unknown at Unknown time  Unknown, Entered By History   loperamide (IMODIUM) 2 MG capsule Take 2 mg by mouth 3 times daily as needed for diarrhea Unknown at Unknown time  Unknown, Entered By History   Skin Protectants, Misc. (CALAZIME SKIN PROTECTANT EX) Externally apply 1 g topically as needed Unknown at Unknown time   Unknown, Entered By History   triamcinolone (KENALOG) 0.1 % cream Apply topically 2 times daily TO AFFECTED AREAS OF LEG FOR 3-4 WEEKS FOR ATOPIC DERMATITIS Unknown at Unknown time  Unknown, Entered By History

## 2018-09-06 NOTE — PROGRESS NOTES
[facility], [place]    Internal Medicine Progress Note - [facility] Service    Today  - Continue cefepime  - PT recommending home PT at [facility]  - Trend BMP to ensure appropriate correction of electrolytes  - Follow up urine cx  - increase loperamide for incontinence    Assessment & Plan   Reena Medellin is 89 year old female with a PMHx significant for osteoporosis, CKD, HTN, and renal cancer s/p right nephrectomy and recurrent cystitis, presenting with acute cystitis and diarrhea after failing several courses of outpatient antibiotic therapy. Also found to have an MEME, hypokalemia and hyponatremia on admission.      # Acute cystitis  Patient with a history of recurrent cystitis presenting with ongoing urinary retention/ urgency/ frequency x1.5 months. Has been evaluated 3 times in the past few months for similar symptoms with treatment including Cipro, Cefdinir, and more recently Levaquin/amoxicillin on 8/14. UA on admission consistent with cystitis with UC pending.  No clinical signs of pyelonephritis:  CVA tenderness, fever, flank pain.  Was hypotensive on admission but lactic acid wnl. Recurrent cystitis likely 2/2 urinary retention and stool incontinence. Renal US (s/p right nephrectomy 2/2 to renal cancer) negative for hydronephrosis.  - Continue cefepime   - Follow up urine cx and abx susceptibilities      # Hyponatremia  Na 127 upon presentation. Differential includes hypovolemic hyponatremia (diarrhea, decrease PO), SIADH vs combination of the two. Na initially improved after initiation of IV normal saline but IV access lost during the day on 06/05. Na lower on recheck.  - sodium jumped to wnl today, but do not trust previous 129; more likely has gradually increased; will discontinue IVF, encourage PO intake, trend lytes     # Hypokalemia  Potassium of 3.2 on admission.  Likely 2/2 MEME in the setting of CKD.   - Will replete as needed     # MEME  # Anion Gap Metabolic Acidosis  Cr 3.98  upon presentation.  Hx of CKD.  However, baseline appears to be around 0.9-1.1.  Suspect prerenal in etiology given recent diarrhea, decreased oral intake and hypotension on presentation. Renal ultrasound negative for obstruction. Cautious regarding fluid resuscitation in the setting of hyponatremia and potential SIADH.    - off IVF  - Trend BMP     # Diarrhea  Increased diarrhea for 2-3 days prior to admission. C. Diff negative.  Likely antibiotic-associated diarrhea in the setting of recent antibiotic use, though per further discussion with patient's family, this is a chronic issue, and may be dietary, recurrent antibiotics, and/or microscopic colitis. No recent sick contacts, travel, nor new foods. Received one dose of IV Flagyl in the ED.    - C. Diff negative  - Enteric panel negative  - Loperamide 2mg AM, 4mg PM + PRN, will uptitrate as tolerated     # Recent Fall with scalp laceration (8/6)  Evaluated at OSH.  CT cervical spine and head unremarkable.  No sequale post fall and has not had any other episodes since.  ? Deconditioning component in setting of advanced age.  - PT/ OT evaluation, recommending home PT     Chronic Problems  Osteoporosis:  Continue PTA Vitamin D    # Pain Assessment:  Current Pain Score 9/6/2018   Patient currently in pain? denies   Reena s pain level was assessed and she currently denies pain.      Diet: Combination Diet Regular Diet Adult  Fluids: off IVF  DVT Prophylaxis: Pneumatic Compression Devices  Code Status: DNR / DNI    Disposition Plan   Expected discharge: 2 - 3 days, recommended to prior living arrangement once vitals stable and adequate treatment plan implemented for cystis .     Entered: Raymond Perkins 09/06/2018, 5:53 PM   Information in the above section will display in the discharge planner report.      The patient's care was discussed with the Attending Physician, Dr. Salguero .    Raymond Perkins  [facility]  Maroon: 1  Pager: [phone]  Please  see sticky note for cross cover information    Interval History   Nursing notes reviewed, no acute events overnight. Continues to have diarrhea (though improved), less urgency/frequency today. Eating better.    Physical Exam   Vital Signs: Temp: 95.6  F (35.3  C) Temp src: Axillary BP: 136/66 Pulse: 80   Resp: 18 SpO2: 98 % O2 Device: None (Room air)    Weight: 150 lbs 0 oz  General Appearance: Well appearing.  Resting comfortably in bed.  No acute distress.     HEENT: Two well healed excoriations on crown of head (from previous fall). PERRL, MMM. Respiratory: Breathing comfortably on room air. Lungs CTAB, no crackles, rhonchi or wheezes  Cardiovascular: RRR.  No murmur, gallops, or rubs.  GI: Soft, non-distended. no suprapubic tenderness today. No rebound or guarding. Normoactive BS.   Skin: No concerning lesions     Back: No CVA tenderness  Neurologic: AOx3, no focal motor or sensory deficits.  Psychiatric: Normal mood and affect.      Data   Medications       aspirin  81 mg Oral Daily     ceFEPIme (MAXIPIME) IV  1 g Intravenous Q24H     cholecalciferol  400 Units Oral Daily     [START ON 9/7/2018] loperamide  2 mg Oral QAM     loperamide  4 mg Oral QPM     potassium chloride  20 mEq Oral Daily     Data     Recent Labs  Lab 09/06/18  0633 09/05/18  1327 09/05/18  0725  09/04/18  1714   WBC 5.3  --   --   --  6.0   HGB 11.8  --   --   --  14.0   MCV 89  --   --   --  90     --   --   --  229   * 129* 134  < > 127*   POTASSIUM 3.0* 3.8 3.1*  < > 3.2*   CHLORIDE 113* 108 105  < > 93*   CO2 17* 10* 13*  < > 16*   BUN 63* 81* 87*  < > 92*   CR 1.45* 2.22* 2.77*  < > 3.98*   ANIONGAP 14 11 16*  < > 18*   SABRINA 7.9* 7.4* 7.3*  < > 8.0*   GLC 84 99 73  < > 93   ALBUMIN  --   --   --   --  3.9   PROTTOTAL  --   --   --   --  7.9   BILITOTAL  --   --   --   --  0.4   ALKPHOS  --   --   --   --  122   ALT  --   --   --   --  17   AST  --   --   --   --  20   < > = values in this interval not displayed.  No results  found for this or any previous visit (from the past 24 hour(s)).     Pt was seen and examined with Dr. Perkins; confirmed abdominal exam findings, reviewed labs, vitals, imaging.  I agree with the detailed A/P documentation above.  Labs improving, will discontinue IV fluids and monitor po intake.  Awaiting urine culture results to inform antibiotic treatment plan.      Delicia Salguero MD

## 2018-09-06 NOTE — PLAN OF CARE
Problem: Patient Care Overview  Goal: Plan of Care/Patient Progress Review  [id number]  VSS. A&Ox4. Forgetful. Bed alarm on as pt gets up quickly to use commode. Still having diarrhea. Voiding small amounts. Encouraging po intake on Reg diet. IVF TKO. Denies pain or nausea. K+ 3. Additional replacement ordered and given.

## 2018-09-07 ENCOUNTER — APPOINTMENT (OUTPATIENT)
Dept: OCCUPATIONAL THERAPY | Facility: CLINIC | Age: 83
DRG: 394 | End: 2018-09-07
Payer: MEDICARE

## 2018-09-07 ENCOUNTER — APPOINTMENT (OUTPATIENT)
Dept: PHYSICAL THERAPY | Facility: CLINIC | Age: 83
DRG: 394 | End: 2018-09-07
Payer: MEDICARE

## 2018-09-07 LAB
ANION GAP SERPL CALCULATED.3IONS-SCNC: 7 MMOL/L (ref 3–14)
BACTERIA SPEC CULT: NORMAL
BUN SERPL-MCNC: 40 MG/DL (ref 7–30)
CALCIUM SERPL-MCNC: 8.1 MG/DL (ref 8.5–10.1)
CHLORIDE SERPL-SCNC: 118 MMOL/L (ref 94–109)
CO2 SERPL-SCNC: 20 MMOL/L (ref 20–32)
CREAT SERPL-MCNC: 1.13 MG/DL (ref 0.52–1.04)
GFR SERPL CREATININE-BSD FRML MDRD: 45 ML/MIN/1.7M2
GLUCOSE SERPL-MCNC: 84 MG/DL (ref 70–99)
POTASSIUM SERPL-SCNC: 4 MMOL/L (ref 3.4–5.3)
SODIUM SERPL-SCNC: 145 MMOL/L (ref 133–144)
SPECIMEN SOURCE: NORMAL

## 2018-09-07 PROCEDURE — 97530 THERAPEUTIC ACTIVITIES: CPT | Mod: GP

## 2018-09-07 PROCEDURE — A9270 NON-COVERED ITEM OR SERVICE: HCPCS | Mod: GY | Performed by: HOSPITALIST

## 2018-09-07 PROCEDURE — A9270 NON-COVERED ITEM OR SERVICE: HCPCS | Mod: GY | Performed by: STUDENT IN AN ORGANIZED HEALTH CARE EDUCATION/TRAINING PROGRAM

## 2018-09-07 PROCEDURE — A9270 NON-COVERED ITEM OR SERVICE: HCPCS | Mod: GY | Performed by: PATHOLOGY

## 2018-09-07 PROCEDURE — 36415 COLL VENOUS BLD VENIPUNCTURE: CPT | Performed by: STUDENT IN AN ORGANIZED HEALTH CARE EDUCATION/TRAINING PROGRAM

## 2018-09-07 PROCEDURE — 25000132 ZZH RX MED GY IP 250 OP 250 PS 637: Mod: GY | Performed by: HOSPITALIST

## 2018-09-07 PROCEDURE — 40000982 ZZH STATISTICAL HAIKU-CANTO PHOTO

## 2018-09-07 PROCEDURE — 25000128 H RX IP 250 OP 636: Performed by: HOSPITALIST

## 2018-09-07 PROCEDURE — 25000132 ZZH RX MED GY IP 250 OP 250 PS 637: Mod: GY | Performed by: STUDENT IN AN ORGANIZED HEALTH CARE EDUCATION/TRAINING PROGRAM

## 2018-09-07 PROCEDURE — 97165 OT EVAL LOW COMPLEX 30 MIN: CPT | Mod: GO | Performed by: OCCUPATIONAL THERAPIST

## 2018-09-07 PROCEDURE — 97110 THERAPEUTIC EXERCISES: CPT | Mod: GP

## 2018-09-07 PROCEDURE — 40000193 ZZH STATISTIC PT WARD VISIT

## 2018-09-07 PROCEDURE — 40000141 ZZH STATISTIC PERIPHERAL IV START W/O US GUIDANCE

## 2018-09-07 PROCEDURE — 40000133 ZZH STATISTIC OT WARD VISIT: Performed by: OCCUPATIONAL THERAPIST

## 2018-09-07 PROCEDURE — 97535 SELF CARE MNGMENT TRAINING: CPT | Mod: GO | Performed by: OCCUPATIONAL THERAPIST

## 2018-09-07 PROCEDURE — 12000001 ZZH R&B MED SURG/OB UMMC

## 2018-09-07 PROCEDURE — 97116 GAIT TRAINING THERAPY: CPT | Mod: GP

## 2018-09-07 PROCEDURE — 25000132 ZZH RX MED GY IP 250 OP 250 PS 637: Mod: GY | Performed by: PATHOLOGY

## 2018-09-07 PROCEDURE — 99233 SBSQ HOSP IP/OBS HIGH 50: CPT | Mod: GC | Performed by: INTERNAL MEDICINE

## 2018-09-07 PROCEDURE — 80048 BASIC METABOLIC PNL TOTAL CA: CPT | Performed by: STUDENT IN AN ORGANIZED HEALTH CARE EDUCATION/TRAINING PROGRAM

## 2018-09-07 RX ORDER — LOPERAMIDE HCL 2 MG
2 CAPSULE ORAL 3 TIMES DAILY PRN
Qty: 30 CAPSULE | Refills: 1 | Status: SHIPPED | OUTPATIENT
Start: 2018-09-07 | End: 2018-09-10

## 2018-09-07 RX ORDER — LOPERAMIDE HCL 2 MG
4 CAPSULE ORAL EVERY MORNING
Status: DISCONTINUED | OUTPATIENT
Start: 2018-09-07 | End: 2018-09-10 | Stop reason: HOSPADM

## 2018-09-07 RX ORDER — CIPROFLOXACIN 250 MG/1
250 TABLET, FILM COATED ORAL 2 TIMES DAILY
Qty: 6 TABLET | Refills: 0 | Status: SHIPPED | OUTPATIENT
Start: 2018-09-07 | End: 2018-09-07

## 2018-09-07 RX ORDER — LOPERAMIDE HCL 2 MG
4 CAPSULE ORAL 2 TIMES DAILY
Qty: 60 CAPSULE | Refills: 1 | Status: SHIPPED | OUTPATIENT
Start: 2018-09-07 | End: 2018-09-10

## 2018-09-07 RX ORDER — BISMUTH SUBSALICYLATE 262 MG/1
524 TABLET, CHEWABLE ORAL
Status: DISCONTINUED | OUTPATIENT
Start: 2018-09-07 | End: 2018-09-10 | Stop reason: HOSPADM

## 2018-09-07 RX ADMIN — ASPIRIN 81 MG: 81 TABLET, COATED ORAL at 08:04

## 2018-09-07 RX ADMIN — CHOLECALCIFEROL TAB 10 MCG (400 UNIT) 400 UNITS: 10 TAB at 08:03

## 2018-09-07 RX ADMIN — POTASSIUM CHLORIDE 20 MEQ: 750 TABLET, EXTENDED RELEASE ORAL at 08:02

## 2018-09-07 RX ADMIN — CEFEPIME HYDROCHLORIDE 1 G: 1 INJECTION, POWDER, FOR SOLUTION INTRAMUSCULAR; INTRAVENOUS at 08:03

## 2018-09-07 RX ADMIN — PEPTIC RELIEF 524 MG: 262 TABLET ORAL at 13:42

## 2018-09-07 RX ADMIN — LOPERAMIDE HYDROCHLORIDE 4 MG: 2 CAPSULE ORAL at 08:04

## 2018-09-07 ASSESSMENT — ACTIVITIES OF DAILY LIVING (ADL)
ADLS_ACUITY_SCORE: 16

## 2018-09-07 NOTE — PROGRESS NOTES
09/07/18 1600   Quick Adds   Type of Visit Initial Occupational Therapy Evaluation   Living Environment   Lives With facility resident   Living Arrangements assisted living   Home Accessibility no concerns   Number of Stairs to Enter Home 0   Number of Stairs Within Home 0   Transportation Available family or friend will provide   Living Environment Comment Pt goes to dining room for meals   Self-Care   Usual Activity Tolerance moderate   Current Activity Tolerance fair   Regular Exercise no   Equipment Currently Used at Home grab bar;raised toilet;shower chair;walker, rolling   Activity/Exercise/Self-Care Comment Pt reports assistance with showering and occasionally for dressing   Functional Level Prior   Ambulation 1-->assistive equipment   Transferring 1-->assistive equipment   Toileting 1-->assistive equipment   Bathing 3-->assistive equipment and person   Dressing 0-->independent   Eating 0-->independent   Communication 0-->understands/communicates without difficulty   Swallowing 0-->swallows foods/liquids without difficulty   Cognition 0 - no cognition issues reported   Fall history within last six months yes   Number of times patient has fallen within last six months 1   Which of the above functional risks had a recent onset or change? ambulation;transferring;fall history   Prior Functional Level Comment Pt utilizes 4WW, some assistance with dressing and assistance for bathing   General Information   Onset of Illness/Injury or Date of Surgery - Date 09/04/18   Referring Physician Raymond Bryson   Patient/Family Goals Statement To return home   Additional Occupational Profile Info/Pertinent History of Current Problem Reena Medellin is 89 year old female with a PMHx significant for osteoporosis, CKD, HTN, and renal cancer s/p right nephrectomy and recurrent cystitis, presenting with acute cystitis and diarrhea after failing several courses of outpatient antibiotic therapy. Also found to have an MEME, hypokalemia  "and hyponatremia on admission   Precautions/Limitations fall precautions   Sensory Examination   Sensory Quick Adds No deficits were identified   Pain Assessment   Patient Currently in Pain No   Integumentary/Edema   Integumentary/Edema no deficits were identifed   Range of Motion (ROM)   ROM Quick Adds No deficits were identified   Strength   Manual Muscle Testing Quick Adds No deficits were identified   Coordination   Upper Extremity Coordination No deficits were identified   Activities of Daily Living Analysis   Impairments Contributing to Impaired Activities of Daily Living strength decreased;cognition impaired   General Therapy Interventions   Planned Therapy Interventions ADL retraining;cognition   Clinical Impression   Criteria for Skilled Therapeutic Interventions Met yes, treatment indicated;evaluation only   OT Diagnosis decreased ADL independence   Influenced by the following impairments medical status, cognition   Assessment of Occupational Performance 3-5 Performance Deficits   Identified Performance Deficits dressing, grooming, bathing   Clinical Decision Making (Complexity) Low complexity   Therapy Frequency 5 times/wk   Predicted Duration of Therapy Intervention (days/wks) 3 days   Anticipated Discharge Disposition Long Term Care Facility;Home with Assist  (return to intermediate)   Risks and Benefits of Treatment have been explained. Yes   Patient, Family & other staff in agreement with plan of care Yes   Clinical Impression Comments Pt presents with decreased activity tolerance and endurance and cognitive difficulties resulting in decreased ADL independence.   [facility] AM-PAC TM \"6 Clicks\"   2016, Trustees of [facility], under license to [employer].  All rights reserved.   6 Clicks Short Forms Daily Activity Inpatient Short Form   [facility] AM-PAC  \"6 Clicks\" Daily Activity Inpatient Short Form   1. Putting on and taking off regular lower body clothing? 3 - A Little   2. Bathing " (including washing, rinsing, drying)? 2 - A Lot   3. Toileting, which includes using toilet, bedpan or urinal? 4 - None   4. Putting on and taking off regular upper body clothing? 3 - A Little   5. Taking care of personal grooming such as brushing teeth? 3 - A Little   6. Eating meals? 4 - None   Daily Activity Raw Score (Score out of 24.Lower scores equate to lower levels of function) 19   Total Evaluation Time   Total Evaluation Time (Minutes) 5

## 2018-09-07 NOTE — PLAN OF CARE
Pt A/o x4, VSS on RA. Awake intermittently throughout the night. Scheduled and PRN immodium for severe diarrhea, pt noting improvements. Skin is intact. Up with assist of 1 to commode. No complaints of pain. Continue to monitor.

## 2018-09-07 NOTE — PLAN OF CARE
Problem: Patient Care Overview  Goal: Plan of Care/Patient Progress Review  Outcome: Improving  Pt alert and oriented, VSS on RA. Voiding spontaneously without difficulty, urgent and frequent watery stools. Up with 1 assist to bedside commode with walker. Regular diet with good appetite. Imodium scheduled for diarrhea. Bed alarm for possible self transfers to toilet. Left PIV saline locked- IV Cefepime q 24 hr.

## 2018-09-07 NOTE — PROGRESS NOTES
Care Coordinator - Discharge Planning    Admission Date/Time:  9/4/2018  Attending MD:  Delicia Pettit*     Data  Date of initial CC assessment:    Chart reviewed, discussed with interdisciplinary team.   Patient was admitted for:   1. Clostridium difficile diarrhea    2. Diarrhea, unspecified type    3. Acute kidney injury (H)         Assessment   Full assessment completed in previous note    Coordination of Care and Referrals: Provided patient/family with options for discharge  Per MD in morning rounds, patient medically ready to discharge home today. [facility] updated.  Patient needs to return between 1-3pm. Family to provide transportation. Any new medications to be filled at [facility] pharmacy on [address]. Orders to be faxed to [facility] [phone]. [facility] updated and orders to be faxed to them.    Addendum [id number]: Notified by Dr. Perkins that patient will no longer be discharging today. Updated [facility]. They are unable to accept patient back over the weekend. MD aware. Plan for discharge on Monday.      Plan  Anticipated Discharge Date:  Today  Anticipated Discharge Plan:  [facility] with home care        Fabiana Haynes RN

## 2018-09-07 NOTE — PLAN OF CARE
Problem: Patient Care Overview  Goal: Plan of Care/Patient Progress Review  Outcome: Therapy, progress toward functional goals as expected  Discharge Planner PT 5A  Patient plan for discharge: Assisted living facility  Current status: Progressed gait endurance and pattern (100ft total) with FWW, CGA. Facilitated commode transfer from bed with FWW and CGA for balance as pt felt unsteady at times and has had knees buckle previously. Progressed standing time and LE strength with 4 STS. Pt demonstrates cognitive deficits as instructions/directions were repeated frequently and pt ask same questions after being answered.   Barriers to return to prior living situation: medical status and deconditioning  Recommendations for discharge: Anticipate discharge back to assisted living facility with HH PT   Rationale for recommendations: Anticipate discharge back to assisted living facility with  PT once medical stable, as pt continues to demonstrate the need for skilled PT services to increase strength and activity tolerance during functional mobility.       Entered by: Flaquita Butler 09/07/2018 12:30 PM

## 2018-09-07 NOTE — PROGRESS NOTES
[facility] [place]    Internal Medicine Progress Note - [facility] Service    Today  - Discontinue cefepime (received 3 doses). Will hold off on starting PO antibiotics since patient completed adequate duration of IV therapy and is currently without signs or symptoms of infection  - Increase loperamide dose and start pepto bismol for diarrhea   - Bladder scan this PM if decreased UOP  - Possible discharge to nursing home tomorrow    Assessment & Plan   Reena Medellin is 89 year old female with a PMHx significant for osteoporosis, CKD, HTN, and renal cancer s/p right nephrectomy and recurrent cystitis, presenting with acute cystitis and diarrhea after failing several courses of outpatient antibiotic therapy. Also found to have an EMME, hypokalemia and hyponatremia on admission.      # Acute cystitis  Patient with a history of recurrent cystitis presenting with ongoing urinary retention/ urgency/ frequency x1.5 months. Has been evaluated 3 times in the past few months for similar symptoms with treatment including Cipro, Cefdinir, and more recently Levaquin/amoxicillin on 8/14. UA on admission consistent with cystitis with UC pending.  No clinical signs of pyelonephritis:  CVA tenderness, fever, flank pain.  Was hypotensive on admission but lactic acid wnl. Recurrent cystitis likely 2/2 urinary retention and stool incontinence. Renal US (s/p right nephrectomy 2/2 to renal cancer) negative for hydronephrosis.  - Discontinue cefepime (09/05, 09/06, 09/07). Will hold off on starting PO antibiotics since patient completed adequate duration of IV therapy and is currently without signs or symptoms of infection  - Ucx showed >100K colonies of mixed urogenital terrie  - Blood cx- NGTD      # Hyponatremia, resolved   Na 127 upon presentation. Differential includes hypovolemic hyponatremia (diarrhea, decrease PO), SIADH vs combination of the two. Na improved after initiation of IVF and increased PO intake  so likely hypovolemic hyponatremia.   - Continue to trend Na  - Continue to encourage PO intake      # Hypokalemia, resolved   Potassium of 3.2 on admission.  Likely 2/2 MEME in the setting of CKD.   - Continue to trend K  - Will replete as needed     # MEME, improving   # Anion Gap Metabolic Acidosis, resolved   Cr 3.98 upon presentation.  Hx of CKD.  However, baseline appears to be around 0.9-1.1.  Suspect prerenal in etiology given recent diarrhea, decreased oral intake and hypotension on presentation. Renal ultrasound negative for obstruction.   - Continue to tend BMP and encourage PO intake     # Diarrhea  Increased diarrhea for 2-3 days prior to admission. C. Diff negative.  Likely antibiotic-associated diarrhea in the setting of recent antibiotic use, though per further discussion with patient's family, this is a chronic issue, and may be dietary, recurrent antibiotics, and/or microscopic colitis. No recent sick contacts, travel, nor new foods. Received one dose of IV Flagyl in the ED.    - C. Diff negative  - Enteric panel negative  - Loperamide 4mg AM, 4mg PM + PRN, will uptitrate as tolerated   - Start pepto bismol QID  - Will monitor for improvement in diarrhea now that antibiotics have been discontinued     # Recent Fall with scalp laceration (8/6)  Evaluated at OSH.  CT cervical spine and head unremarkable.  No sequale post fall and has not had any other episodes since.  ? Deconditioning component in setting of advanced age.  - PT/ OT evaluation, recommending home PT     Chronic Problems  Osteoporosis:  Continue PTA Vitamin D    # Pain Assessment:  Current Pain Score 9/7/2018   Patient currently in pain? denlevon Valles s pain level was assessed and she currently denies pain.      Diet: Combination Diet Regular Diet Adult  Fluids: off IVF  DVT Prophylaxis: Pneumatic Compression Devices  Code Status: DNR / DNI    Disposition Plan   Expected discharge: 1-2 days , recommended to prior living arrangement once  vitals stable and adequate treatment plan implemented for cystis .     Entered: Myra Valle 09/07/2018, 7:08 AM   Information in the above section will display in the discharge planner report.      The patient's care was discussed with the Attending Physician, Dr. Salguero .    Myra Valle MD   [facility]: 1  Pager: [phone]  Please see sticky note for cross cover information    Interval History   Nursing notes reviewed, no acute events overnight. Reports improvement in diarrhea, urinary frequency and suprapubic pain. Denies subjective fevers, chills, chest pain or SOB. Overall, she thinks she's improving but doesn't feel like she's back to her baseline. No other concerns.     Physical Exam   Vital Signs: Temp: 97.1  F (36.2  C) Temp src: Oral BP: 123/64 Pulse: 76   Resp: 16 SpO2: 96 % O2 Device: None (Room air)    Weight: 150 lbs 0 oz  General Appearance: Well appearing.  Resting comfortably in bed.  No acute distress.     HEENT: Two well healed excoriations on crown of head (from previous fall). PERRL, MMM.   Respiratory: Breathing comfortably on room air. Lungs CTAB, no crackles, rhonchi or wheezes  Cardiovascular: RRR.  No murmur, gallops, or rubs.  GI: Soft, non-distended, mild suprapubic tenderness with deep palpation. No rebound or guarding. Normoactive BS.   Skin: No concerning lesions     Back: No CVA tenderness  Neurologic: AOx3, no focal motor or sensory deficits.  Psychiatric: Normal mood and affect.      Data   Medications       aspirin  81 mg Oral Daily     ceFEPIme (MAXIPIME) IV  1 g Intravenous Q24H     cholecalciferol  400 Units Oral Daily     loperamide  4 mg Oral QAM     loperamide  4 mg Oral QPM     potassium chloride  20 mEq Oral Daily     Data     Recent Labs  Lab 09/06/18  0633 09/05/18  1327 09/05/18  0725  09/04/18  1714   WBC 5.3  --   --   --  6.0   HGB 11.8  --   --   --  14.0   MCV 89  --   --   --  90     --   --   --  229   * 129* 134  <  > 127*   POTASSIUM 3.0* 3.8 3.1*  < > 3.2*   CHLORIDE 113* 108 105  < > 93*   CO2 17* 10* 13*  < > 16*   BUN 63* 81* 87*  < > 92*   CR 1.45* 2.22* 2.77*  < > 3.98*   ANIONGAP 14 11 16*  < > 18*   SABRINA 7.9* 7.4* 7.3*  < > 8.0*   GLC 84 99 73  < > 93   ALBUMIN  --   --   --   --  3.9   PROTTOTAL  --   --   --   --  7.9   BILITOTAL  --   --   --   --  0.4   ALKPHOS  --   --   --   --  122   ALT  --   --   --   --  17   AST  --   --   --   --  20   < > = values in this interval not displayed.  No results found for this or any previous visit (from the past 24 hour(s)).     Pt was seen and examined by me; confirmed abdominal exam findings; reviewed labs and vital signs.  I agree with the detailed A/P documentation above.  Concern for ongoing diarrhea with 8 stools recorded yesterday; will increase loperamide, add bismuth.  Will discontinue IVF today, encourage po, stop antibiotics, but observe another day to assess stability and hydration status off the infusion.    Delicia Salguero MD

## 2018-09-07 NOTE — PLAN OF CARE
Problem: Patient Care Overview  Goal: Plan of Care/Patient Progress Review  Discharge Planner OT   Patient plan for discharge: Return to [facility]  Current status: Pt scoring 9/30 on SLUMS cognitive screen, demonstrating difficulty with short term memory and attention.  Pt unable to identify the day of the week and state  Barriers to return to prior living situation: Cognitive deficits  Recommendations for discharge: Pt may benefit from assistance to ensure safety while performing ADL, Pt has assistance with cooking and medication management, may be safe for return to [facility]  Rationale for recommendations: Pt will benefit from skilled OT services to ensure safety and independence with ADL       Entered by: George Blackman 09/07/2018 4:29 PM

## 2018-09-08 LAB
ANION GAP SERPL CALCULATED.3IONS-SCNC: 8 MMOL/L (ref 3–14)
BUN SERPL-MCNC: 27 MG/DL (ref 7–30)
CALCIUM SERPL-MCNC: 8.2 MG/DL (ref 8.5–10.1)
CHLORIDE SERPL-SCNC: 117 MMOL/L (ref 94–109)
CO2 SERPL-SCNC: 18 MMOL/L (ref 20–32)
CREAT SERPL-MCNC: 0.94 MG/DL (ref 0.52–1.04)
GFR SERPL CREATININE-BSD FRML MDRD: 55 ML/MIN/1.7M2
GLUCOSE SERPL-MCNC: 88 MG/DL (ref 70–99)
POTASSIUM SERPL-SCNC: 4.1 MMOL/L (ref 3.4–5.3)
SODIUM SERPL-SCNC: 144 MMOL/L (ref 133–144)

## 2018-09-08 PROCEDURE — 80048 BASIC METABOLIC PNL TOTAL CA: CPT | Performed by: STUDENT IN AN ORGANIZED HEALTH CARE EDUCATION/TRAINING PROGRAM

## 2018-09-08 PROCEDURE — A9270 NON-COVERED ITEM OR SERVICE: HCPCS | Mod: GY | Performed by: HOSPITALIST

## 2018-09-08 PROCEDURE — 25000132 ZZH RX MED GY IP 250 OP 250 PS 637: Mod: GY | Performed by: STUDENT IN AN ORGANIZED HEALTH CARE EDUCATION/TRAINING PROGRAM

## 2018-09-08 PROCEDURE — A9270 NON-COVERED ITEM OR SERVICE: HCPCS | Mod: GY | Performed by: PATHOLOGY

## 2018-09-08 PROCEDURE — 99232 SBSQ HOSP IP/OBS MODERATE 35: CPT | Mod: GC | Performed by: INTERNAL MEDICINE

## 2018-09-08 PROCEDURE — 12000001 ZZH R&B MED SURG/OB UMMC

## 2018-09-08 PROCEDURE — 25000132 ZZH RX MED GY IP 250 OP 250 PS 637: Mod: GY | Performed by: HOSPITALIST

## 2018-09-08 PROCEDURE — 36415 COLL VENOUS BLD VENIPUNCTURE: CPT | Performed by: STUDENT IN AN ORGANIZED HEALTH CARE EDUCATION/TRAINING PROGRAM

## 2018-09-08 PROCEDURE — 25000132 ZZH RX MED GY IP 250 OP 250 PS 637: Mod: GY | Performed by: PATHOLOGY

## 2018-09-08 PROCEDURE — A9270 NON-COVERED ITEM OR SERVICE: HCPCS | Mod: GY | Performed by: STUDENT IN AN ORGANIZED HEALTH CARE EDUCATION/TRAINING PROGRAM

## 2018-09-08 RX ADMIN — POTASSIUM CHLORIDE 20 MEQ: 750 TABLET, EXTENDED RELEASE ORAL at 08:40

## 2018-09-08 RX ADMIN — PEPTIC RELIEF 524 MG: 262 TABLET ORAL at 16:55

## 2018-09-08 RX ADMIN — ASPIRIN 81 MG: 81 TABLET, COATED ORAL at 08:39

## 2018-09-08 RX ADMIN — LOPERAMIDE HYDROCHLORIDE 4 MG: 2 CAPSULE ORAL at 19:34

## 2018-09-08 RX ADMIN — CHOLECALCIFEROL TAB 10 MCG (400 UNIT) 400 UNITS: 10 TAB at 08:38

## 2018-09-08 RX ADMIN — LOPERAMIDE HYDROCHLORIDE 4 MG: 2 CAPSULE ORAL at 08:39

## 2018-09-08 ASSESSMENT — ACTIVITIES OF DAILY LIVING (ADL)
ADLS_ACUITY_SCORE: 15
ADLS_ACUITY_SCORE: 16
ADLS_ACUITY_SCORE: 15

## 2018-09-08 NOTE — PLAN OF CARE
Problem: Patient Care Overview  Goal: Plan of Care/Patient Progress Review  Outcome: Improving   09/06/18 2242   OTHER   Plan Of Care Reviewed With patient   Plan of Care Review   Progress improving     AVSS. Denies pain. Voiding adequately. Had very small loose BM x1. Up to commode with assist of 1. Planning for possible discharge to [facility] on either Sat. Or Mon. Depending on availability. Continue with POC.     Problem: Diarrhea (Adult)  Goal: Identify Related Risk Factors and Signs and Symptoms  Related risk factors and signs and symptoms are identified upon initiation of Human [employer] Clinical Practice Guideline (CPG).   Outcome: Improving   09/06/18 2230   Diarrhea   Related Risk Factors (Diarrhea) antibiotics   Signs and Symptoms (Diarrhea) urgency;loose, watery stool

## 2018-09-08 NOTE — PROGRESS NOTES
[facility] [place]    Internal Medicine Progress Note - [place] Service    Today  - Less stool output over the past 24 hours with increased dose of loperamide and starting pepto bismol. Will continue to monitor today.   - Urinary retention/urgency resolved  - Appetite improved  - No electrolyte derrangements     Assessment & Plan   Reena Medellin is 89 year old female with a PMHx significant for osteoporosis, CKD, HTN, and renal cancer s/p right nephrectomy and recurrent cystitis, presenting with acute cystitis and diarrhea after failing several courses of outpatient antibiotic therapy. Also found to have an MEME, hypokalemia and hyponatremia on admission. Remains afebrile with improvement in urinary sx and diarrhea.     # Acute cystitis  Patient with a history of recurrent cystitis presenting with ongoing urinary retention/ urgency/ frequency x1.5 months. Has been evaluated 3 times in the past few months for similar symptoms with treatment including Cipro, Cefdinir, and more recently Levaquin/amoxicillin on 8/14. UA on admission consistent with cystitis with UC pending.  No clinical signs of pyelonephritis:  CVA tenderness, fever, flank pain.  Was hypotensive on admission but lactic acid wnl. Recurrent cystitis likely 2/2 urinary retention and stool incontinence. Renal US (s/p right nephrectomy 2/2 to renal cancer) negative for hydronephrosis.  - Cefepime discontinued (09/05, 09/06, 09/07). Hold off on starting PO antibiotics since patient completed adequate duration of IV therapy and is currently without signs or symptoms of infection  - Ucx showed >100K colonies of mixed urogenital terrie  - Blood cx- NGTD      # Hyponatremia, resolved  Na 127 upon presentation. Differential includes hypovolemic hyponatremia (diarrhea, decrease PO), SIADH vs combination of the two. Na improved after initiation of IVF and increased PO intake so likely hypovolemic hyponatremia.   - Continue to trend Na  -  Continue to encourage PO intake      # Hypokalemia, resolved   Potassium of 3.2 on admission.  Likely 2/2 MEME in the setting of CKD.   - Continue to trend K  - Will replete as needed     # MEME, improving   # Anion Gap Metabolic Acidosis, resolved   Cr 3.98 upon presentation.  Hx of CKD.  However, baseline appears to be around 0.9-1.1.  Suspect prerenal in etiology given recent diarrhea, decreased oral intake and hypotension on presentation. Renal ultrasound negative for obstruction.   - Continue to tend BMP and encourage PO intake     # Diarrhea  Increased diarrhea for 2-3 days prior to admission. C. Diff negative.  Likely antibiotic-associated diarrhea in the setting of recent antibiotic use, though per further discussion with patient's family, this is a chronic issue, and may be dietary, recurrent antibiotics, and/or microscopic colitis. No recent sick contacts, travel, nor new foods. Received one dose of IV Flagyl in the ED.    - C. Diff and enteric panel negative  - Loperamide 4mg AM, 4mg PM + PRN, will uptitrate as tolerated   - Continue pepto bismol QID  - Will continue to monitor for improvement in diarrhea now that antibiotics have been discontinued     # Recent Fall with scalp laceration (8/6)  Evaluated at OSH.  CT cervical spine and head unremarkable.  No sequale post fall and has not had any other episodes since.  ? Deconditioning component in setting of advanced age.  - PT/ OT evaluation, recommending home PT     Chronic Problems  Osteoporosis:  Continue PTA Vitamin D    # Pain Assessment:  Current Pain Score 9/8/2018   Patient currently in pain? jose Valles s pain level was assessed and she currently denies pain.      Diet: Combination Diet Regular Diet Adult  Advance Diet as Tolerated  Fluids: off IVF  DVT Prophylaxis: Pneumatic Compression Devices  Code Status: DNR / DNI    Disposition Plan   Expected discharge: 1-2 days , recommended to prior living arrangement once vitals stable and urinary  symptoms improved and diarrhea well controlled.     Entered: Myra Valle 09/08/2018, 6:37 AM   Information in the above section will display in the discharge planner report.      The patient's care was discussed with the Attending Physician, Dr. Salguero .    Myra Valle MD   [facility]  Pager: [phone]  Please see sticky note for cross cover information    Interval History   Nursing notes reviewed, no acute events overnight. Reports continued improvement in urinary retention/urgency and diarrhea. Denies suprapubic pain, fevers, chills, chest pain or SOB. Appetite is improved. No other concerns.     Physical Exam   Vital Signs: Temp: 96.2  F (35.7  C) Temp src: Oral BP: 131/63 Pulse: 61   Resp: 16 SpO2: 98 % O2 Device: None (Room air)    Weight: 150 lbs 0 oz  General Appearance: Well appearing.  Resting comfortably in bed.  No acute distress.     HEENT: Two well healed excoriations on crown of head (from previous fall). PERRL, MMM.   Respiratory: Breathing comfortably on room air. Lungs CTAB, no crackles, rhonchi or wheezes  Cardiovascular: RRR.  No murmur, gallops, or rubs.  GI: Soft, non-distended, no suprapubic tenderness. No rebound or guarding. Normoactive BS.   Skin: No concerning lesions     Back: No CVA tenderness  Neurologic: AOx3, no focal motor or sensory deficits.  Psychiatric: Normal mood and affect.      Data   Medications       aspirin  81 mg Oral Daily     bismuth subsalicylate  524 mg Oral 4x Daily AC & HS     cholecalciferol  400 Units Oral Daily     loperamide  4 mg Oral QAM     loperamide  4 mg Oral QPM     potassium chloride  20 mEq Oral Daily     Data     Recent Labs  Lab 09/07/18  0737 09/06/18  0633 09/05/18  1327  09/04/18  1714   WBC  --  5.3  --   --  6.0   HGB  --  11.8  --   --  14.0   MCV  --  89  --   --  90   PLT  --  170  --   --  229   * 145* 129*  < > 127*   POTASSIUM 4.0 3.0* 3.8  < > 3.2*   CHLORIDE 118* 113* 108  < > 93*   CO2 20 17*  10*  < > 16*   BUN 40* 63* 81*  < > 92*   CR 1.13* 1.45* 2.22*  < > 3.98*   ANIONGAP 7 14 11  < > 18*   SABRINA 8.1* 7.9* 7.4*  < > 8.0*   GLC 84 84 99  < > 93   ALBUMIN  --   --   --   --  3.9   PROTTOTAL  --   --   --   --  7.9   BILITOTAL  --   --   --   --  0.4   ALKPHOS  --   --   --   --  122   ALT  --   --   --   --  17   AST  --   --   --   --  20   < > = values in this interval not displayed.  No results found for this or any previous visit (from the past 24 hour(s)).     Pt was seen and examined with the team on morning rounds; diarrhea and stool output have improved with addition of bismuth plus loperamide. Completed course of IV abx for UTI; monitor for urinary retention. I agree with the detailed A/P documentation above.  Transfer back to assisted living on Monday.    Delicia Salguero MD

## 2018-09-08 NOTE — PLAN OF CARE
Problem: Patient Care Overview  Goal: Plan of Care/Patient Progress Review  Outcome: No Change  Pt A/Ox4, VSS and denies pain. Pt up w/ A1 to BSC, mixed urine and stool recorded. Pt bladder scanned for 200mL prior to voiding, was able to then void 150 after bladder scan. R PIV placed and infusing TKO, IV cefepime administered during the day. OT and PT worked with pt. Plan is for pt to DC to [facility] Sat vs Mon depending on availability.

## 2018-09-08 NOTE — PLAN OF CARE
Problem: Patient Care Overview  Goal: Plan of Care/Patient Progress Review  Outcome: No Change  Pt A/Ox4, VSS and denies pain. Pt up w/ A1 to BSC, mixed urine and stool recorded. ROSHAN SIMMONS OT worked with pt. Plan is for pt to DC to [facility] Mon. Pt finished IV abx, not on PO abx d/t lack of s/s of infection.

## 2018-09-08 NOTE — PLAN OF CARE
Problem: Patient Care Overview  Goal: Plan of Care/Patient Progress Review  Outcome: No Change  Pt A&O, forgetful. VSS on RA. Up with assist x 1 + walker. Bed alarm on for urgency to bathroom. Void x 1. Smear BM. Denies pain/nausea. Per MD, discontinuing IV abx. Plan for discharge to [facility] on Monday.

## 2018-09-09 LAB
ANION GAP SERPL CALCULATED.3IONS-SCNC: 7 MMOL/L (ref 3–14)
BUN SERPL-MCNC: 17 MG/DL (ref 7–30)
CALCIUM SERPL-MCNC: 8.2 MG/DL (ref 8.5–10.1)
CHLORIDE SERPL-SCNC: 117 MMOL/L (ref 94–109)
CO2 SERPL-SCNC: 20 MMOL/L (ref 20–32)
CREAT SERPL-MCNC: 0.9 MG/DL (ref 0.52–1.04)
GFR SERPL CREATININE-BSD FRML MDRD: 58 ML/MIN/1.7M2
GLUCOSE SERPL-MCNC: 84 MG/DL (ref 70–99)
POTASSIUM SERPL-SCNC: 4 MMOL/L (ref 3.4–5.3)
SODIUM SERPL-SCNC: 144 MMOL/L (ref 133–144)

## 2018-09-09 PROCEDURE — 12000001 ZZH R&B MED SURG/OB UMMC

## 2018-09-09 PROCEDURE — A9270 NON-COVERED ITEM OR SERVICE: HCPCS | Mod: GY | Performed by: HOSPITALIST

## 2018-09-09 PROCEDURE — 25000132 ZZH RX MED GY IP 250 OP 250 PS 637: Mod: GY | Performed by: HOSPITALIST

## 2018-09-09 PROCEDURE — 80048 BASIC METABOLIC PNL TOTAL CA: CPT | Performed by: STUDENT IN AN ORGANIZED HEALTH CARE EDUCATION/TRAINING PROGRAM

## 2018-09-09 PROCEDURE — 25000132 ZZH RX MED GY IP 250 OP 250 PS 637: Mod: GY | Performed by: PATHOLOGY

## 2018-09-09 PROCEDURE — 25000132 ZZH RX MED GY IP 250 OP 250 PS 637: Mod: GY | Performed by: STUDENT IN AN ORGANIZED HEALTH CARE EDUCATION/TRAINING PROGRAM

## 2018-09-09 PROCEDURE — A9270 NON-COVERED ITEM OR SERVICE: HCPCS | Mod: GY | Performed by: STUDENT IN AN ORGANIZED HEALTH CARE EDUCATION/TRAINING PROGRAM

## 2018-09-09 PROCEDURE — 36415 COLL VENOUS BLD VENIPUNCTURE: CPT | Performed by: STUDENT IN AN ORGANIZED HEALTH CARE EDUCATION/TRAINING PROGRAM

## 2018-09-09 PROCEDURE — A9270 NON-COVERED ITEM OR SERVICE: HCPCS | Mod: GY | Performed by: PATHOLOGY

## 2018-09-09 PROCEDURE — 99232 SBSQ HOSP IP/OBS MODERATE 35: CPT | Mod: GC | Performed by: INTERNAL MEDICINE

## 2018-09-09 RX ADMIN — CHOLECALCIFEROL TAB 10 MCG (400 UNIT) 400 UNITS: 10 TAB at 10:07

## 2018-09-09 RX ADMIN — LOPERAMIDE HYDROCHLORIDE 4 MG: 2 CAPSULE ORAL at 10:07

## 2018-09-09 RX ADMIN — ASPIRIN 81 MG: 81 TABLET, COATED ORAL at 10:07

## 2018-09-09 RX ADMIN — Medication 5 MG: at 21:31

## 2018-09-09 RX ADMIN — POTASSIUM CHLORIDE 20 MEQ: 750 TABLET, EXTENDED RELEASE ORAL at 10:07

## 2018-09-09 ASSESSMENT — ACTIVITIES OF DAILY LIVING (ADL)
ADLS_ACUITY_SCORE: 15

## 2018-09-09 NOTE — PROGRESS NOTES
[facility], [place]    Internal Medicine Progress Note - Maroon Service    Today  - Only a few stools yesterday, appears to have stabilized with scheduled loperamide and pepto bismol  - Otherwise, no new changes to the medical plan, awaiting return to [facility]    Assessment & Plan   Reena Medellin is 89 year old female with a PMHx significant for osteoporosis, CKD, HTN, and renal cancer s/p right nephrectomy and recurrent cystitis, presenting with acute cystitis and diarrhea after failing several courses of outpatient antibiotic therapy. Also found to have an MEME, hypokalemia and hyponatremia on admission. Remains afebrile with improvement in urinary sx, electrolytes, and diarrhea.     # Acute cystitis  Patient with a history of recurrent cystitis presenting with ongoing urinary retention/ urgency/ frequency x1.5 months. Has been evaluated 3 times in the past few months for similar symptoms with treatment including Cipro, Cefdinir, and more recently Levaquin/amoxicillin on 8/14. UA on admission consistent with cystitis with UC pending.  No clinical signs of pyelonephritis:  CVA tenderness, fever, flank pain.  Was hypotensive on admission but lactic acid wnl. Recurrent cystitis likely 2/2 urinary retention and stool incontinence. Renal US (s/p right nephrectomy 2/2 to renal cancer) negative for hydronephrosis.  - Cefepime discontinued (09/05, 09/06, 09/07). Hold off on starting PO antibiotics since patient completed adequate duration of IV therapy and is currently without signs or symptoms of infection  - Ucx showed >100K colonies of mixed urogenital terrie  - Blood cx- NGTD      # Hyponatremia, resolved  Na 127 upon presentation. Differential includes hypovolemic hyponatremia (diarrhea, decrease PO), SIADH vs combination of the two. Na improved after initiation of IVF and increased PO intake so likely hypovolemic hyponatremia.   - Continue to trend Na  - Continue to encourage PO intake      #  Hypokalemia, resolved   Potassium of 3.2 on admission.  Likely 2/2 MEME in the setting of CKD.   - Continue to trend K  - Will replete as needed     # MEME, improving   # Anion Gap Metabolic Acidosis, resolved   Cr 3.98 upon presentation.  Hx of CKD.  However, baseline appears to be around 0.9-1.1.  Suspect prerenal in etiology given recent diarrhea, decreased oral intake and hypotension on presentation. Renal ultrasound negative for obstruction.   - Continue to tend BMP and encourage PO intake     # Diarrhea  Increased diarrhea for 2-3 days prior to admission. C. Diff negative.  Likely antibiotic-associated diarrhea in the setting of recent antibiotic use, though per further discussion with patient's family, this is a chronic issue, and may be dietary, recurrent antibiotics, and/or microscopic colitis. No recent sick contacts, travel, nor new foods. Received one dose of IV Flagyl in the ED.    - C. Diff and enteric panel negative  - Loperamide 4mg AM, 4mg PM + PRN  - Continue pepto bismol QID  - Will continue to monitor for improvement in diarrhea now that antibiotics have been discontinued     # Recent Fall with scalp laceration (8/6)  Evaluated at OSH.  CT cervical spine and head unremarkable.  No sequale post fall and has not had any other episodes since.  ? Deconditioning component in setting of advanced age.  - PT/ OT evaluation, recommending home PT     Chronic Problems  Osteoporosis:  Continue PTA Vitamin D    # Pain Assessment:  Current Pain Score 9/9/2018   Patient currently in pain? jose Valles s pain level was assessed and she currently denies pain.      Diet: Combination Diet Regular Diet Adult  Advance Diet as Tolerated  Fluids: off IVF  DVT Prophylaxis: Pneumatic Compression Devices  Code Status: DNR / DNI    Disposition Plan   Expected discharge: 1-2 days , recommended to prior living arrangement once vitals stable and urinary symptoms improved and diarrhea well controlled.     Entered: Raymond ARCHULETA  Gregorio 09/09/2018, 10:27 AM   Information in the above section will display in the discharge planner report.      The patient's care was discussed with the Attending Physician, Dr. Salguero .    Raymond Perkins MD   [facility]: 1  Pager: [phone]  Please see sticky note for cross cover information    Interval History   Nursing notes reviewed, no acute events overnight. Reports continued improvement in urinary retention/urgency. Only had 4 stools yesterday. Denies suprapubic pain, fevers, chills, chest pain or SOB. Appetite is improved. No other concerns.     Physical Exam   Vital Signs: Temp: 97.7  F (36.5  C) Temp src: Oral BP: 168/81 Pulse: 83   Resp: 20 SpO2: 96 % O2 Device: None (Room air)    Weight: 150 lbs 0 oz  General Appearance: Well appearing.  Resting comfortably in bed.  No acute distress.     HEENT: Two well healed excoriations on crown of head (from previous fall). PERRL, MMM.   Respiratory: Breathing comfortably on room air. Lungs CTAB, no crackles, rhonchi or wheezes  Cardiovascular: RRR.  No murmur, gallops, or rubs.  GI: Soft, non-distended, no suprapubic tenderness. No rebound or guarding. Normoactive BS.   Skin: No concerning lesions     Back: No CVA tenderness  Neurologic: AOx3, no focal motor or sensory deficits.  Psychiatric: Normal mood and affect.      Data   Medications       aspirin  81 mg Oral Daily     bismuth subsalicylate  524 mg Oral 4x Daily AC & HS     cholecalciferol  400 Units Oral Daily     loperamide  4 mg Oral QAM     loperamide  4 mg Oral QPM     potassium chloride  20 mEq Oral Daily     Data     Recent Labs  Lab 09/08/18  0716 09/07/18  0737 09/06/18  0633  09/04/18  1714   WBC  --   --  5.3  --  6.0   HGB  --   --  11.8  --  14.0   MCV  --   --  89  --  90   PLT  --   --  170  --  229    145* 145*  < > 127*   POTASSIUM 4.1 4.0 3.0*  < > 3.2*   CHLORIDE 117* 118* 113*  < > 93*   CO2 18* 20 17*  < > 16*   BUN 27 40* 63*  < > 92*   CR 0.94 1.13*  1.45*  < > 3.98*   ANIONGAP 8 7 14  < > 18*   SABRINA 8.2* 8.1* 7.9*  < > 8.0*   GLC 88 84 84  < > 93   ALBUMIN  --   --   --   --  3.9   PROTTOTAL  --   --   --   --  7.9   BILITOTAL  --   --   --   --  0.4   ALKPHOS  --   --   --   --  122   ALT  --   --   --   --  17   AST  --   --   --   --  20   < > = values in this interval not displayed.  No results found for this or any previous visit (from the past 24 hour(s)).     Pt was seen and examined by me with the team on morning rounds; confirmed abdominal exam findings, reviewed labs, vitals, imaging.  I agree with the detailed A/P documentation above.  Diarrhea appears to have improved, continue loperamide and bismuth.  Completed course of antibiotics for UTI.  Transfer back to assisted living tomorrow.      Delicia Salguero MD

## 2018-09-09 NOTE — PLAN OF CARE
Problem: Patient Care Overview  Goal: Plan of Care/Patient Progress Review  Outcome: No Change  [id number]: Pt alert, disoriented to time, forgetful. VSS on RA. Up with assist x 1 + walker. Bed alarm on for forgetfulness. Void x 2. Smear BM. Denies pain/nausea. Plan for discharge to [facility] on Monday.

## 2018-09-09 NOTE — PLAN OF CARE
Problem: Patient Care Overview  Goal: Plan of Care/Patient Progress Review  Outcome: No Change  [id number]:     Reason for admission: acute cystitis and diarrhea   Vitals: VSS on RA  Activity: assist x1 + walker, bed alarm for safety   Pain: no c/o pain  Neuro: WNL ex forgetful, word-searches regularly   Cardiac: WNL  Respiratory: WNL, clear  GI/: diarrhea, voiding small amts, last BM today  Diet: Regular diet, good appetite  Lines: R PIV SL  Wounds: old wound posterior head from fall - open to air, CDI  Labs/imaging: GFR 58, Cl 117, K 4.0      New changes this shift: pt oriented after being awake for 15-30 mins - otherwise pt is quite disoriented immediately after waking up; pt refusing pepto-bismol at this time (pt said she will ask for them if she wants them), no other clinical changes     Plan: dc back to LTC facility tomorrow      Continue to monitor and follow POC

## 2018-09-10 ENCOUNTER — APPOINTMENT (OUTPATIENT)
Dept: PHYSICAL THERAPY | Facility: CLINIC | Age: 83
DRG: 394 | End: 2018-09-10
Payer: MEDICARE

## 2018-09-10 VITALS
RESPIRATION RATE: 16 BRPM | WEIGHT: 150 LBS | TEMPERATURE: 98.3 F | HEART RATE: 81 BPM | SYSTOLIC BLOOD PRESSURE: 167 MMHG | OXYGEN SATURATION: 100 % | DIASTOLIC BLOOD PRESSURE: 81 MMHG

## 2018-09-10 LAB
ANION GAP SERPL CALCULATED.3IONS-SCNC: 7 MMOL/L (ref 3–14)
BACTERIA SPEC CULT: NO GROWTH
BACTERIA SPEC CULT: NO GROWTH
BUN SERPL-MCNC: 16 MG/DL (ref 7–30)
CALCIUM SERPL-MCNC: 8.4 MG/DL (ref 8.5–10.1)
CHLORIDE SERPL-SCNC: 116 MMOL/L (ref 94–109)
CO2 SERPL-SCNC: 21 MMOL/L (ref 20–32)
CREAT SERPL-MCNC: 0.91 MG/DL (ref 0.52–1.04)
GFR SERPL CREATININE-BSD FRML MDRD: 57 ML/MIN/1.7M2
GLUCOSE SERPL-MCNC: 86 MG/DL (ref 70–99)
Lab: NORMAL
Lab: NORMAL
POTASSIUM SERPL-SCNC: 4.6 MMOL/L (ref 3.4–5.3)
SODIUM SERPL-SCNC: 144 MMOL/L (ref 133–144)
SPECIMEN SOURCE: NORMAL
SPECIMEN SOURCE: NORMAL

## 2018-09-10 PROCEDURE — 36415 COLL VENOUS BLD VENIPUNCTURE: CPT | Performed by: STUDENT IN AN ORGANIZED HEALTH CARE EDUCATION/TRAINING PROGRAM

## 2018-09-10 PROCEDURE — 99238 HOSP IP/OBS DSCHRG MGMT 30/<: CPT | Mod: GC | Performed by: INTERNAL MEDICINE

## 2018-09-10 PROCEDURE — 40000193 ZZH STATISTIC PT WARD VISIT

## 2018-09-10 PROCEDURE — 25000132 ZZH RX MED GY IP 250 OP 250 PS 637: Mod: GY | Performed by: STUDENT IN AN ORGANIZED HEALTH CARE EDUCATION/TRAINING PROGRAM

## 2018-09-10 PROCEDURE — 97530 THERAPEUTIC ACTIVITIES: CPT | Mod: GP

## 2018-09-10 PROCEDURE — 97116 GAIT TRAINING THERAPY: CPT | Mod: GP

## 2018-09-10 PROCEDURE — 80048 BASIC METABOLIC PNL TOTAL CA: CPT | Performed by: STUDENT IN AN ORGANIZED HEALTH CARE EDUCATION/TRAINING PROGRAM

## 2018-09-10 PROCEDURE — A9270 NON-COVERED ITEM OR SERVICE: HCPCS | Mod: GY | Performed by: STUDENT IN AN ORGANIZED HEALTH CARE EDUCATION/TRAINING PROGRAM

## 2018-09-10 PROCEDURE — A9270 NON-COVERED ITEM OR SERVICE: HCPCS | Mod: GY | Performed by: HOSPITALIST

## 2018-09-10 PROCEDURE — 25000132 ZZH RX MED GY IP 250 OP 250 PS 637: Mod: GY | Performed by: HOSPITALIST

## 2018-09-10 PROCEDURE — 25000132 ZZH RX MED GY IP 250 OP 250 PS 637: Mod: GY | Performed by: PATHOLOGY

## 2018-09-10 PROCEDURE — A9270 NON-COVERED ITEM OR SERVICE: HCPCS | Mod: GY | Performed by: PATHOLOGY

## 2018-09-10 RX ORDER — BISMUTH SUBSALICYLATE 262 MG/1
524 TABLET, CHEWABLE ORAL
Qty: 56 TABLET | Refills: 1 | Status: SHIPPED | OUTPATIENT
Start: 2018-09-10

## 2018-09-10 RX ORDER — LOPERAMIDE HCL 2 MG
2 CAPSULE ORAL 2 TIMES DAILY PRN
Qty: 30 CAPSULE | Refills: 1 | Status: SHIPPED | OUTPATIENT
Start: 2018-09-10

## 2018-09-10 RX ADMIN — LOPERAMIDE HYDROCHLORIDE 4 MG: 2 CAPSULE ORAL at 08:48

## 2018-09-10 RX ADMIN — CHOLECALCIFEROL TAB 10 MCG (400 UNIT) 400 UNITS: 10 TAB at 08:48

## 2018-09-10 RX ADMIN — POTASSIUM CHLORIDE 20 MEQ: 750 TABLET, EXTENDED RELEASE ORAL at 08:48

## 2018-09-10 RX ADMIN — ASPIRIN 81 MG: 81 TABLET, COATED ORAL at 08:48

## 2018-09-10 ASSESSMENT — ACTIVITIES OF DAILY LIVING (ADL)
ADLS_ACUITY_SCORE: 15

## 2018-09-10 NOTE — PLAN OF CARE
Problem: Patient Care Overview  Goal: Plan of Care/Patient Progress Review  Occupational Therapy Discharge Summary    Reason for therapy discharge:    Discharged to assisted living facility    Progress towards therapy goal(s). See goals on Care Plan in [facility] electronic health record for goal details.  Goals partially met.  Barriers to achieving goals:   discharge from facility.    Therapy recommendation(s):    Recommend continued skilled OT to increase activity tolerance and independence with ADL completion.

## 2018-09-10 NOTE — PROGRESS NOTES
Care Coordinator - Discharge Planning    Admission Date/Time:  9/4/2018  Attending MD:  Delicia Pettit*     Data  Date of initial CC assessment:    Chart reviewed, discussed with interdisciplinary team.   Patient was admitted for:   1. Acute cystitis without hematuria    2. Clostridium difficile diarrhea    3. Diarrhea, unspecified type    4. Acute kidney injury (H)         Assessment   Full assessment completed in previous note    Coordination of Care and Referrals: Provided patient/family with options for discharge  Patient medically ready to discharge home today and therapy has cleared her to return to [facility]. Spoke with Carla at [facility] and updated. Patient needs to return by 1pm and they would like orders ASAP. Gaurav Keenan requests that we set up transportation for patient and understands the additional cost for that. [employer] wheelchair ride set up for 1230pm. Bedside RN aware and will have patient ready.     Addendum 1036: Notified by bedside RN that daughter in law called and will be arranging family to provide discharge transportation. [facility] ride cancelled.      Plan  Anticipated Discharge Date:  Today  Anticipated Discharge Plan:  [facility] with home care        Fabiana Haynes RN

## 2018-09-10 NOTE — PLAN OF CARE
Problem: Patient Care Overview  Goal: Plan of Care/Patient Progress Review  Discharge Planner PT 5A  Patient plan for discharge: return to [facility]  Current status: pt completes all mobility with SBA today; increased tolerance of ambulation to 2 x 75' with FWW and SBA; reports she feels more safe with FWW vs 4WW as it does not roll as fast.   Barriers to return to prior living situation: weakness, fatigue  Recommendations for discharge: return to [facility] with assist as needed, HHPT  Rationale for recommendations: pt will benefit from continued therapy to improve functional strength, endurance and activity tolerance.        Entered by: Raymond Baltazar 09/10/2018 9:08 AM

## 2018-09-10 NOTE — PLAN OF CARE
Problem: Patient Care Overview  Goal: Plan of Care/Patient Progress Review  Outcome: No Change  [id number]. Pt alert, disoriented to time. Forgetful, especially when awoken. Bed alarm on. Pt calling appropriately overnight. VSS on RA. Continued HTN. Denies pain/nausea. Up with assist x 1 to BSC. Void x 2. No BM. Melatonin given for sleep, pt sleeping better than previous night. Plan for discharge to [facility] today. SW to set up transportation.

## 2018-09-10 NOTE — PLAN OF CARE
Problem: Patient Care Overview  Goal: Plan of Care/Patient Progress Review  Outcome: Adequate for Discharge Date Met: 09/10/18  Pt adequate for discharge today, medically stable. Worked with PT this AM, moves with SBA with little to no hands on help other than from FWW (do not use 4WW, pt does not move well with this). Per family, they are bringing her a FWW to her [facility]. Report called to halfway SWATI Aguiar, no questions or concerns. Orders faxed and sent packet with pt. Pt family member picked up pt for transport back to facility. Pt left unit in stable condition with all belongings @ 1200.

## 2018-09-10 NOTE — DISCHARGE SUMMARY
[facility], [place]    Internal Medicine Discharge Summary- Beckie Service    Date of Admission:  9/4/2018  Date of Discharge:  9/10/2018 12:04 PM  Discharging Attending Provider: Dr. Delicia Escudero   Discharge Team: Beckie 1    Discharge Diagnoses   Acute cystitis   Acute on chronic diarrhea  MEME    Follow-ups Needed After Discharge   - Follow up with PCP in 1-2 weeks to ensure resolution of cystitis and to titrate loperamide and Pepto bismol  - Home physical therapy ordered     Hospital Course   Reena Medellin was admitted on 9/4/2018 for acute cystitis and diarrhea after failing several courses of outpatient antibiotic therapy. The following problems were addressed during her hospitalization:    # Acute cystitis  Patient presented with ongoing urinary retention/ urgency/ frequency x1.5 months. She had been evaluated 3 times in month prior to admission for similar symptoms with treatment including Cipro, Cefdinir, and Levaquin/amoxicillin. UA on admission consistent with cystitis with urine culture postive for >100K mixed urogenital terrie. No clinical signs of pyelonephritis on admission. Recurrent cystitis thought to be secondary to urinary retention and stool incontinence. Patient was treated with IV cefepime for 3 days and was not started on PO antibiotics afterwards since she had no clinical signs of infection. On the day of discharge, patient denied urinary urgency, frequency or retention.     # Hyponatremia, resolved  Na 127 upon presentation. Differential includes hypovolemic hyponatremia (diarrhea, decrease PO), SIADH vs combination of the two. Na improved after initiation of IVF and increased PO intake so likely hypovolemic hyponatremia.   - Continue to trend Na  - Continue to encourage PO intake       # Hypokalemia, resolved   Potassium of 3.2 on admission.  Likely 2/2 MEME in the setting of CKD.   - Continue to trend K  - Will replete as needed    # Diarrhea  Increased  diarrhea for 2-3 days prior to admission. C. Diff and enteric panel negative. Thought to be antibiotic-associated diarrhea in the setting of recent antibiotic use, though per further discussion with patient's family, this is a chronic issue. May be dietary and/or microscopic colitis.  - Patient started on scheduled loperamide 4 mg BID (was previously taking as needed) during this hospitalization. Continue scheduled loperamide upon discharge plus PRN loperamide until PCP visit. Titrate to 1-2 bowel movements daily  - Also started on scheduled Pepto Bismol QID, continue upon discharge until PCP visit       # MEME  # Anion Gap Metabolic Acidosis  # Hyponatremia  # Hypokalemia   Cr 3.98 upon presentation, baseline 0.9-1.1. Likely prerenal in etiology given diarrhea and decreased oral intake prior to admission. Renal ultrasound negative for obstruction.   - Creatinine, potassium and sodium normalized after IV fluid and increased PO intake       # Discharge Pain Plan:   - Patient currently has NO PAIN and is not being prescribed pain medications on discharge.    Consultations This Hospital Stay   PHYSICAL THERAPY ADULT IP CONSULT  OCCUPATIONAL THERAPY ADULT IP CONSULT    Code Status   DNR / DNI       The patient was discussed with Dr. Delicia Valle MD   [facility]  ______________________________________________________________________    Physical Exam   Vital Signs: Temp: 98.3  F (36.8  C) Temp src: Oral BP: 167/81 Pulse: 81   Resp: 16 SpO2: 100 % O2 Device: None (Room air)    Weight: 150 lbs 0 oz    General Appearance: Well appearing.  Resting comfortably in bed. No acute distress.   HEENT: Two well healed excoriations on crown of head (from previous fall). PERRL, MMM.   Respiratory: Breathing comfortably on room air. Lungs CTAB, no crackles, rhonchi or wheezes  Cardiovascular: RRR.  No murmur, gallops, or rubs.  GI: Soft, non-distended, no suprapubic tenderness. No rebound or  guarding. Normoactive BS.   Skin: No concerning lesions     Back: No CVA tenderness  Neurologic: AOx3, no focal motor or sensory deficits.   Psychiatric: Normal mood and affect.    Significant Results and Procedures   Most Recent 3 CBC's:  Recent Labs   Lab Test  09/06/18   0633  09/04/18   1714   WBC  5.3  6.0   HGB  11.8  14.0   MCV  89  90   PLT  170  229     Most Recent 3 BMP's:  Recent Labs   Lab Test  09/10/18   0616  09/09/18   0730  09/08/18   0716   NA  144  144  144   POTASSIUM  4.6  4.0  4.1   CHLORIDE  116*  117*  117*   CO2  21  20  18*   BUN  16  17  27   CR  0.91  0.90  0.94   ANIONGAP  7  7  8   SABRINA  8.4*  8.2*  8.2*   GLC  86  84  88   ,   Results for orders placed or performed during the hospital encounter of 09/04/18   US Renal Complete    Narrative    EXAMINATION: US RENAL COMPLETE, 9/5/2018 12:19 AM     COMPARISON: None.    HISTORY: History of recurrent UTIs. Concern for hydronephrosis.    FINDINGS:    Right kidney: The right kidney is surgically absent. The right renal  fossa is unremarkable.    Left kidney: Measures 10.6 cm in length. Parenchyma is of normal  thickness and echogenicity. No focal mass. No hydronephrosis.     Bladder: Unremarkable.        Impression    IMPRESSION:   1. Unremarkable ultrasound of the left kidney. No evidence of  hydronephrosis.  2. The right kidney is surgically absent.    I have personally reviewed the examination and initial interpretation  and I agree with the findings.    ELADIO HINES MD       Pending Results   None   Unresulted Labs Ordered in the Past 30 Days of this Admission     No orders found from 7/6/2018 to 9/5/2018.             Primary Care Physician   Physician No Ref-Primary    Discharge Disposition   Discharged to nursing home  Condition at discharge: Good    Discharge Orders     Home care nursing referral     Home Care PT Referral for Hospital Discharge     Home Care OT Referral for Hospital Discharge     General info for SNF   Length of Stay  Estimate: Long Term Care  Condition at Discharge: Improving  Level of care:board and care  Rehabilitation Potential: Good  Admission H&P remains valid and up-to-date: Yes  Recent Chemotherapy: N/A  Use Nursing Home Standing Orders: Yes     Mantoux instructions   Give two-step Mantoux (PPD) Per Facility Policy Yes     Reason for your hospital stay   You were admitted to the hospital a urinary tract infection and diarrhea. You were treated with IV antibiotics for the urinary tract infection. Your dose of loperamide was also increased to help with your diarrhea, which is likely due to your recent use of antibiotics.     Follow Up and recommended labs and tests   - Follow up with primary care provider early next week to ensure adequate treatment of cystitis and diarrhea. The following labs/tests are recommended: Basic metabolic panel.     Activity - Up ad coco     Encourage PO fluids     DNR/DNI     Physical Therapy Adult Consult   Evaluate and treat as clinically indicated.    Reason:  Deconditioning in the setting of ongoing urinary tract infection     Advance Diet as Tolerated   Follow this diet upon discharge: Regular       Discharge Medications   Discharge Medication List as of 9/10/2018  1:30 PM      START taking these medications    Details   bismuth subsalicylate (PEPTO BISMOL) 262 MG chewable tablet Take 2 tablets (524 mg) by mouth 4 times daily (before meals and nightly), Disp-56 tablet, R-1, Local Print         CONTINUE these medications which have CHANGED    Details   loperamide (IMODIUM) 2 MG capsule Take 1 capsule (2 mg) by mouth 2 times daily as needed for diarrhea (titrate to 1-2 stools daily), Disp-30 capsule, R-1, Local Print         CONTINUE these medications which have NOT CHANGED    Details   aspirin 81 MG EC tablet Take 81 mg by mouth daily, Historical      Cholecalciferol (VITAMIN D3) 400 units CAPS Take 1 capsule by mouth daily, Historical      melatonin 5 MG tablet Take 5 mg by mouth nightly as  needed for sleep, Historical      metoprolol succinate (TOPROL-XL) 100 MG 24 hr tablet Take 100 mg by mouth daily, Historical      Multiple Vitamins-Minerals (MULTIVITAMIN ADULT PO) Take 1 tablet by mouth daily, Historical      acetaminophen (TYLENOL) 500 MG tablet Take 1,000 mg by mouth every 8 hours as needed for mild pain, Historical      Skin Protectants, Misc. (CALAZIME SKIN PROTECTANT EX) Externally apply 1 g topically as needed, Historical      triamcinolone (KENALOG) 0.1 % cream Apply topically 2 times daily TO AFFECTED AREAS OF LEG FOR 3-4 WEEKS FOR ATOPIC DERMATITISHistorical         STOP taking these medications       bismuth subsalicylate 262 MG TABS Comments:   Reason for Stopping:             Allergies   Allergies   Allergen Reactions     Sulfa Drugs        Pt was seen and examined by me on the day of discharge;  I agree with the detailed follow up plans as documented above.    Delicia Salguero MD

## 2018-09-11 NOTE — PLAN OF CARE
Problem: Patient Care Overview  Goal: Plan of Care/Patient Progress Review  Physical Therapy Discharge Summary    Reason for therapy discharge:    Discharged to [facility] with HHPT    Progress towards therapy goal(s). See goals on Care Plan in [facility] electronic health record for goal details.  Goals partially met.  Barriers to achieving goals:   discharge from facility.    Therapy recommendation(s):    Continued therapy is recommended.  Rationale/Recommendations:  to progress strength, functional mobility and activity tolerance..

## 2019-03-01 ENCOUNTER — RECORDS - HEALTHEAST (OUTPATIENT)
Dept: LAB | Facility: CLINIC | Age: 84
End: 2019-03-01

## 2019-03-04 ENCOUNTER — RECORDS - HEALTHEAST (OUTPATIENT)
Dept: LAB | Facility: CLINIC | Age: 84
End: 2019-03-04

## 2019-03-04 LAB
ANION GAP SERPL CALCULATED.3IONS-SCNC: 9 MMOL/L (ref 5–18)
BUN SERPL-MCNC: 23 MG/DL (ref 8–28)
CALCIUM SERPL-MCNC: 8.2 MG/DL (ref 8.5–10.5)
CHLORIDE BLD-SCNC: 113 MMOL/L (ref 98–107)
CO2 SERPL-SCNC: 19 MMOL/L (ref 22–31)
CREAT SERPL-MCNC: 1.01 MG/DL (ref 0.6–1.1)
GFR SERPL CREATININE-BSD FRML MDRD: 51 ML/MIN/1.73M2
GLUCOSE BLD-MCNC: 140 MG/DL (ref 70–125)
POTASSIUM BLD-SCNC: 4.4 MMOL/L (ref 3.5–5)
SODIUM SERPL-SCNC: 141 MMOL/L (ref 136–145)

## 2019-03-05 LAB
ANION GAP SERPL CALCULATED.3IONS-SCNC: 8 MMOL/L (ref 5–18)
BUN SERPL-MCNC: 23 MG/DL (ref 8–28)
CALCIUM SERPL-MCNC: 8.5 MG/DL (ref 8.5–10.5)
CHLORIDE BLD-SCNC: 112 MMOL/L (ref 98–107)
CO2 SERPL-SCNC: 20 MMOL/L (ref 22–31)
CREAT SERPL-MCNC: 1.01 MG/DL (ref 0.6–1.1)
GFR SERPL CREATININE-BSD FRML MDRD: 51 ML/MIN/1.73M2
GLUCOSE BLD-MCNC: 119 MG/DL (ref 70–125)
POTASSIUM BLD-SCNC: 4.8 MMOL/L (ref 3.5–5)
SODIUM SERPL-SCNC: 140 MMOL/L (ref 136–145)

## 2019-04-29 ENCOUNTER — RECORDS - HEALTHEAST (OUTPATIENT)
Dept: LAB | Facility: CLINIC | Age: 84
End: 2019-04-29

## 2019-04-30 LAB
ALBUMIN SERPL-MCNC: 3.3 G/DL (ref 3.5–5)
ANION GAP SERPL CALCULATED.3IONS-SCNC: 9 MMOL/L (ref 5–18)
BUN SERPL-MCNC: 17 MG/DL (ref 8–28)
CALCIUM SERPL-MCNC: 9.1 MG/DL (ref 8.5–10.5)
CHLORIDE BLD-SCNC: 103 MMOL/L (ref 98–107)
CO2 SERPL-SCNC: 22 MMOL/L (ref 22–31)
CREAT SERPL-MCNC: 1.1 MG/DL (ref 0.6–1.1)
ERYTHROCYTE [DISTWIDTH] IN BLOOD BY AUTOMATED COUNT: 12.9 % (ref 11–14.5)
GFR SERPL CREATININE-BSD FRML MDRD: 46 ML/MIN/1.73M2
GLUCOSE BLD-MCNC: 69 MG/DL (ref 70–125)
HCT VFR BLD AUTO: 37 % (ref 35–47)
HGB BLD-MCNC: 11.9 G/DL (ref 12–16)
MCH RBC QN AUTO: 32.2 PG (ref 27–34)
MCHC RBC AUTO-ENTMCNC: 32.2 G/DL (ref 32–36)
MCV RBC AUTO: 100 FL (ref 80–100)
PHOSPHATE SERPL-MCNC: 3.6 MG/DL (ref 2.5–4.5)
PLATELET # BLD AUTO: 181 THOU/UL (ref 140–440)
PMV BLD AUTO: 9.6 FL (ref 8.5–12.5)
POTASSIUM BLD-SCNC: 4.6 MMOL/L (ref 3.5–5)
PTH-INTACT SERPL-MCNC: 150 PG/ML (ref 10–86)
RBC # BLD AUTO: 3.69 MILL/UL (ref 3.8–5.4)
SODIUM SERPL-SCNC: 134 MMOL/L (ref 136–145)
TSH SERPL DL<=0.005 MIU/L-ACNC: 2.73 UIU/ML (ref 0.3–5)
VIT B12 SERPL-MCNC: 417 PG/ML (ref 213–816)
WBC: 5.8 THOU/UL (ref 4–11)

## 2019-05-01 LAB — 25(OH)D3 SERPL-MCNC: 28.1 NG/ML (ref 30–80)

## 2019-06-19 NOTE — H&P
"[facility]    Internal Medicine History and Physical - [facility] ([facility] Service       Date of Admission:  9/4/2018    Chief Complaint   Urinary urgency, frequency, retention    History is obtained from the patient and electronic health record    History of Present Illness   Reena Medellin is a 89 year old female with a PMHx significant for osteoporosis, CKD, HTN, and renal cancer s/p right nephrectomy, who presents with complaints of urinary retention and frequency.    For the past 1.5 months, patient has been experiencing urinary frequency, urgency, and feeling of urinary retention.  No associated flank pain, fevers, or dysuria.  However, has noticed some pressure in the lower abdomen.  Patient has been evaluated for these symptoms 3 times since onset.  First evaluation was on 7/13, at which point she was diagnosed with a UTI and prescribed Cipro.  Next, followed up in the ED on 8/6 after a fall at home where her \"knees gave out\".  Once again noted to have a UTI, and was prescribed cefdinir.  More recently, on 8/14, started on Levaquin and amoxicillin for continued cystitis.    In addition to the aforementioned symptoms, patient has had 4-5 days of nonbloody diarrhea, which has tapered off the past 2 days.  Patient denies any nausea, vomiting, sick contacts, new foods, or recent travel.  She does note having a decreased appetite over the past few months.  Earlier today, patient presented to clinic for evaluation of her diarrhea.  She was found to be hypotensive with blood pressure around 70/40.  Out of concern for her continued urinary symptoms and diarrhea in the setting of hypotension, patient transferred to the ED for evaluation.    Currently, patient complains of fullness in her lower abdomen as well as needing to urinate despite going to the bathroom recently.  She denies any recent sexual intercourse, new perineal soaps/cleansers, or difficulties with wiping her perineum post " void/BMs.       Per chart review, patient fell in early August secondary to weakness in her bilateral knees leading to head trauma with a laceration to the scalp.  She was evaluated in the ED.  CT scan of the head and cervical spine were unremarkable.  Patient denies any recent falls since.  No current lightheadedness, vision changes, or headaches.   Patient uses a walker.    Review of Systems   The 10 point Review of Systems is negative other than noted in the HPI or here.     Past Medical History    Essential HTN  Osteoporoses  Anemia  Renal Cancer    Past Surgical History   S/p right nephrectomy    Social History   Born in [facility].  Currently resides in an assisted living facility.  Quit smoking in her 40s.  Rare, social alcohol use.  No illicit drug use.      Family History   No family history on file.    Prior to Admission Medications   Prior to Admission Medications   Prescriptions Last Dose Informant Patient Reported? Taking?   Cholecalciferol (VITAMIN D3) 400 units CAPS   Yes Yes   Sig: Take 1 capsule by mouth daily   Multiple Vitamins-Minerals (MULTIVITAMIN ADULT PO)   Yes Yes   Sig: Take 1 tablet by mouth daily   acetaminophen (TYLENOL) 500 MG tablet   Yes Yes   Sig: Take 1,000 mg by mouth every 8 hours as needed for mild pain   aspirin 81 MG EC tablet   Yes Yes   Sig: Take 81 mg by mouth daily   loperamide (IMODIUM) 2 MG capsule   Yes Yes   Sig: Take 2 mg by mouth 3 times daily as needed for diarrhea   melatonin 5 MG tablet   Yes Yes   Sig: Take 5 mg by mouth nightly as needed for sleep   metoprolol succinate (TOPROL-XL) 100 MG 24 hr tablet   Yes Yes   Sig: Take 100 mg by mouth daily   triamcinolone (KENALOG) 0.1 % cream   Yes Yes   Sig: Apply topically 2 times daily TO AFFECTED AREAS OF LEG FOR 3-4 WEEKS FOR ATOPIC DERMATITIS      Facility-Administered Medications: None     Allergies   Allergies   Allergen Reactions     Sulfa Drugs        Physical Exam   Vital Signs: Temp: 97  F (36.1  C) Temp src: Oral  BP: 111/58 Pulse: 72   Resp: 18 SpO2: 97 % O2 Device: None (Room air)    Weight: 0 lbs 0 oz  General Appearance: Well appearing.  Resting comfortably in bed.  No acute distress.    Eyes: Sclera anicteric.    HEENT: Crown of head, two well healed excoriations.  Slight surrounding edema but no appreciable ecchymosis.    Respiratory: Breathing comfortably on room air.  Lungs CTAB.  Cardiovascular: Heart RRR.  No murmur, gallops, or rubs.  GI: Soft, non-distended.  Minimal suprapubic tenderness.  No rebound or guarding.  Normoactive BS.  Lymph/Hematologic: No bruising noted.  Genitourinary: Deferred.  400 ml PVR.    Skin: Small ulceration distal portion of right 4th toe.    Musculoskeletal: No bilateral calf tenderness.  No CVA tenderness bilaterally.  Slight bilateral patellar effusion without overlying erythema, warmth, or tenderness.    Neurologic: Moving all extremities.  A&Ox3.    Psychiatric: Normal mood and affect.    Assessment & Plan   Reena Medellin is a 89 year old female with a PMHx significant for osteoporosis, CKD, HTN, and renal cancer s/p right nephrectomy, who was admitted on 9/4 for ongoing cystitis in the setting of acute kidney injury with electrolyte abnormalities.       # Urinary Retention/ Urgency/ Frequency  Ongoing symptoms for the past 1.5 months.  Been evaluated 3 times previously for symptoms with treatment including Cipro, Cefdinir, and more recently Levaquin/amoxicillin on 8/14.  UA today consistent with UTI with UC pending.  No clinical signs of pyelonephritis:  CVA tenderness, fever, flank pain.  Was hypotensive but lactic acid wnl.  Based on clinical history, suspect a chronic cystitis with potential resistant organisms versus underlying kidney stone seeding recurrent infection versus difficulties with relaxation of urethral sphincter.  Obtained US of the left kidney (s/p right nephrectomy 2/2 to renal cancer) to evaluate for hydronephrosis, which was negative.  Straight cath PVR was 400  ml.  May consider ashton if retention becomes driving factor.  Will resume broad antibiotic treatment given Hx of recalcitrant cystitis while UC/antibiotic susceptibility pending.       - Antibiotics: Vancomycin/ Cefepime.          # Acute Kidney Injury  # Hypotension  # High Anion Gap Metabolic Acidosis  Cr 3.98 upon presentation.  Hx of CKD.  However, baseline appears to be around 0.9-1.1.  Suspect related to hypoperfusion given recent diarrhea, decreased oral intake, and hypotension at clinic.  Cautious regarding fluid resuscitation in the setting of hyponatremia and potential SIADH.  Will give fluids judiciously.          # Hyponatremia  Na 127 upon presentation.  Question hypovolemic hyponatremia (diarrhea, decrease PO) versus SIADH or combination of the two.  Urine studies to further differentiate pending.  Will treat with small bolus of fluids to see if sodium levels improve.  If improves, continue IV hydration while being cognizant of electrolyte levels.    - Pending:  Urine osmolality, random sodium urine, random chloride urine  - BMP AM.  Based on correction may consider Q6hour versus BID testing.    # Hypokalemia  Potassium of 3.2 on admission.  Setting of MEME and CKD.  Replace as needed.      - Potassium chloride 30 mEq one time dose  - IVF with supplemental potassium chloride      # Diarrhea  In the setting of recent antibiotic use:  Fluoroquinolones.  Has improved over the past two days.  C. Diff testing negative.  Question gastroenteritis although no recent sick contacts, travel, nor new foods versus antibiotic-associated diarrhea.  Given IV Flagyl in the ED.  Will evaluate for potential causes and in the meantime treat symptomatically.      - Loperamide PRN  - Enteric Panel pending    # Decreased Appetite  Over the past few months.  No associated nausea or vomiting.  Will place on regular diet.  If concern regarding nutrition status, may consider nutrition consult.          # Recent Fall with scalp  laceration (8/6)  Evaluated at OSH.  CT cervical spine and head unremarkable.  No sequale post fall and has not had any other episodes since.  ? Deconditioning component in setting of advanced age.  - PT/ OT evaluation           Chronic Problems  Osteoporosis:  Continue PTA Vitamin D    # Pain Assessment:   Reena mitchell pain level was assessed and she currently denies pain.      Diet:  Regular Diet  Fluids:   DVT Prophylaxis: Pneumatic Compression Devices  Code Status: Full Code    Disposition Plan   Expected discharge: 2 - 3 days; recommended to prior living arrangement once antibiotic plan established.     Entered: Karli Ryan 09/04/2018, 9:52 PM   Information in the above section will display in the discharge planner report.    The patient was discussed with Dr. Andressa Ryan  [facility]   Pager: [phone]  Please see sticky note for cross cover information      Data   Data     Recent Labs  Lab 09/04/18  2300 09/04/18  1714   WBC  --  6.0   HGB  --  14.0   MCV  --  90   PLT  --  229   * 127*   POTASSIUM 2.9* 3.2*   CHLORIDE 101 93*   CO2 16* 16*   BUN 88* 92*   CR 3.40* 3.98*   ANIONGAP 16* 18*   SABRINA 7.1* 8.0*   GLC 88 93   ALBUMIN  --  3.9   PROTTOTAL  --  7.9   BILITOTAL  --  0.4   ALKPHOS  --  122   ALT  --  17   AST  --  20     Recent Results (from the past 24 hour(s))   US Renal Complete    Narrative    PRELIMINARY REPORT - The following report is a preliminary  interpretation.      Impression    IMPRESSION:   1. Unremarkable ultrasound of the left kidney. No evidence of  hydronephrosis.  2. The right kidney is surgically absent.                (0) Absent (0) Absent

## 2019-10-22 ENCOUNTER — RECORDS - HEALTHEAST (OUTPATIENT)
Dept: LAB | Facility: CLINIC | Age: 84
End: 2019-10-22

## 2019-10-22 LAB
ALBUMIN SERPL-MCNC: 3.3 G/DL (ref 3.5–5)
ALP SERPL-CCNC: 174 U/L (ref 45–120)
ALT SERPL W P-5'-P-CCNC: 11 U/L (ref 0–45)
ANION GAP SERPL CALCULATED.3IONS-SCNC: 7 MMOL/L (ref 5–18)
AST SERPL W P-5'-P-CCNC: 22 U/L (ref 0–40)
BASOPHILS # BLD AUTO: 0.1 THOU/UL (ref 0–0.2)
BASOPHILS NFR BLD AUTO: 1 % (ref 0–2)
BILIRUB SERPL-MCNC: 0.3 MG/DL (ref 0–1)
BUN SERPL-MCNC: 24 MG/DL (ref 8–28)
CALCIUM SERPL-MCNC: 9 MG/DL (ref 8.5–10.5)
CALCIUM SERPL-MCNC: 9.3 MG/DL (ref 8.5–10.5)
CHLORIDE BLD-SCNC: 107 MMOL/L (ref 98–107)
CO2 SERPL-SCNC: 24 MMOL/L (ref 22–31)
CREAT SERPL-MCNC: 1.67 MG/DL (ref 0.6–1.1)
CREAT SERPL-MCNC: 1.69 MG/DL (ref 0.6–1.1)
EOSINOPHIL # BLD AUTO: 0.1 THOU/UL (ref 0–0.4)
EOSINOPHIL NFR BLD AUTO: 2 % (ref 0–6)
ERYTHROCYTE [DISTWIDTH] IN BLOOD BY AUTOMATED COUNT: 13.8 % (ref 11–14.5)
GFR SERPL CREATININE-BSD FRML MDRD: 28 ML/MIN/1.73M2
GFR SERPL CREATININE-BSD FRML MDRD: 28 ML/MIN/1.73M2
GLUCOSE BLD-MCNC: 87 MG/DL (ref 70–125)
HCT VFR BLD AUTO: 36.5 % (ref 35–47)
HGB BLD-MCNC: 11.7 G/DL (ref 12–16)
LYMPHOCYTES # BLD AUTO: 2.1 THOU/UL (ref 0.8–4.4)
LYMPHOCYTES NFR BLD AUTO: 30 % (ref 20–40)
MCH RBC QN AUTO: 31.6 PG (ref 27–34)
MCHC RBC AUTO-ENTMCNC: 32.1 G/DL (ref 32–36)
MCV RBC AUTO: 99 FL (ref 80–100)
MONOCYTES # BLD AUTO: 0.4 THOU/UL (ref 0–0.9)
MONOCYTES NFR BLD AUTO: 5 % (ref 2–10)
NEUTROPHILS # BLD AUTO: 4.2 THOU/UL (ref 2–7.7)
NEUTROPHILS NFR BLD AUTO: 62 % (ref 50–70)
PHOSPHATE SERPL-MCNC: 3.9 MG/DL (ref 2.5–4.5)
PLATELET # BLD AUTO: 220 THOU/UL (ref 140–440)
PMV BLD AUTO: 9.1 FL (ref 8.5–12.5)
POTASSIUM BLD-SCNC: 5 MMOL/L (ref 3.5–5)
PROT SERPL-MCNC: 6.4 G/DL (ref 6–8)
PTH-INTACT SERPL-MCNC: 135 PG/ML (ref 10–86)
PTH-INTACT SERPL-MCNC: 139 PG/ML (ref 10–86)
RBC # BLD AUTO: 3.7 MILL/UL (ref 3.8–5.4)
SODIUM SERPL-SCNC: 138 MMOL/L (ref 136–145)
WBC: 6.8 THOU/UL (ref 4–11)

## 2019-10-23 LAB — 25(OH)D3 SERPL-MCNC: 27.6 NG/ML (ref 30–80)

## 2019-11-26 ENCOUNTER — RECORDS - HEALTHEAST (OUTPATIENT)
Dept: LAB | Facility: CLINIC | Age: 84
End: 2019-11-26

## 2019-11-26 LAB
ANION GAP SERPL CALCULATED.3IONS-SCNC: 5 MMOL/L (ref 5–18)
BUN SERPL-MCNC: 17 MG/DL (ref 8–28)
CALCIUM SERPL-MCNC: 8.3 MG/DL (ref 8.5–10.5)
CHLORIDE BLD-SCNC: 111 MMOL/L (ref 98–107)
CO2 SERPL-SCNC: 23 MMOL/L (ref 22–31)
CREAT SERPL-MCNC: 1.4 MG/DL (ref 0.6–1.1)
GFR SERPL CREATININE-BSD FRML MDRD: 35 ML/MIN/1.73M2
GLUCOSE BLD-MCNC: 77 MG/DL (ref 70–125)
POTASSIUM BLD-SCNC: 4.8 MMOL/L (ref 3.5–5)
SODIUM SERPL-SCNC: 139 MMOL/L (ref 136–145)

## 2019-12-17 ENCOUNTER — RECORDS - HEALTHEAST (OUTPATIENT)
Dept: LAB | Facility: CLINIC | Age: 84
End: 2019-12-17

## 2019-12-17 LAB
ALBUMIN UR-MCNC: ABNORMAL MG/DL
APPEARANCE UR: ABNORMAL
BACTERIA #/AREA URNS HPF: ABNORMAL HPF
BILIRUB UR QL STRIP: NEGATIVE
COLOR UR AUTO: YELLOW
GLUCOSE UR STRIP-MCNC: NEGATIVE MG/DL
HGB UR QL STRIP: ABNORMAL
KETONES UR STRIP-MCNC: NEGATIVE MG/DL
LEUKOCYTE ESTERASE UR QL STRIP: ABNORMAL
NITRATE UR QL: NEGATIVE
PH UR STRIP: 5.5 [PH] (ref 4.5–8)
RBC #/AREA URNS AUTO: ABNORMAL HPF
SP GR UR STRIP: 1.03 (ref 1–1.03)
SQUAMOUS #/AREA URNS AUTO: ABNORMAL LPF
UROBILINOGEN UR STRIP-ACNC: ABNORMAL
WBC #/AREA URNS AUTO: >100 HPF
WBC CLUMPS #/AREA URNS HPF: PRESENT /[HPF]

## 2019-12-21 LAB
BACTERIA SPEC CULT: ABNORMAL
BACTERIA SPEC CULT: ABNORMAL

## 2019-12-24 ENCOUNTER — RECORDS - HEALTHEAST (OUTPATIENT)
Dept: LAB | Facility: CLINIC | Age: 84
End: 2019-12-24

## 2019-12-24 LAB
ANION GAP SERPL CALCULATED.3IONS-SCNC: 7 MMOL/L (ref 5–18)
BUN SERPL-MCNC: 16 MG/DL (ref 8–28)
CALCIUM SERPL-MCNC: 9.2 MG/DL (ref 8.5–10.5)
CHLORIDE BLD-SCNC: 107 MMOL/L (ref 98–107)
CO2 SERPL-SCNC: 24 MMOL/L (ref 22–31)
CREAT SERPL-MCNC: 1.26 MG/DL (ref 0.6–1.1)
GFR SERPL CREATININE-BSD FRML MDRD: 39 ML/MIN/1.73M2
GLUCOSE BLD-MCNC: 117 MG/DL (ref 70–125)
POTASSIUM BLD-SCNC: 5.1 MMOL/L (ref 3.5–5)
SODIUM SERPL-SCNC: 138 MMOL/L (ref 136–145)

## 2019-12-31 ENCOUNTER — RECORDS - HEALTHEAST (OUTPATIENT)
Dept: LAB | Facility: CLINIC | Age: 84
End: 2019-12-31

## 2019-12-31 LAB — POTASSIUM BLD-SCNC: 4.4 MMOL/L (ref 3.5–5)

## 2020-01-21 ENCOUNTER — RECORDS - HEALTHEAST (OUTPATIENT)
Dept: LAB | Facility: CLINIC | Age: 85
End: 2020-01-21

## 2020-01-21 LAB
ANION GAP SERPL CALCULATED.3IONS-SCNC: 8 MMOL/L (ref 5–18)
BUN SERPL-MCNC: 18 MG/DL (ref 8–28)
CALCIUM SERPL-MCNC: 8.5 MG/DL (ref 8.5–10.5)
CHLORIDE BLD-SCNC: 108 MMOL/L (ref 98–107)
CO2 SERPL-SCNC: 24 MMOL/L (ref 22–31)
CREAT SERPL-MCNC: 1.37 MG/DL (ref 0.6–1.1)
GFR SERPL CREATININE-BSD FRML MDRD: 36 ML/MIN/1.73M2
GLUCOSE BLD-MCNC: 87 MG/DL (ref 70–125)
POTASSIUM BLD-SCNC: 4.8 MMOL/L (ref 3.5–5)
SODIUM SERPL-SCNC: 140 MMOL/L (ref 136–145)

## 2020-01-31 ENCOUNTER — RECORDS - HEALTHEAST (OUTPATIENT)
Dept: LAB | Facility: CLINIC | Age: 85
End: 2020-01-31

## 2020-01-31 LAB — POTASSIUM BLD-SCNC: 5.3 MMOL/L (ref 3.5–5)

## 2020-02-04 ENCOUNTER — RECORDS - HEALTHEAST (OUTPATIENT)
Dept: LAB | Facility: CLINIC | Age: 85
End: 2020-02-04

## 2020-02-04 LAB
ANION GAP SERPL CALCULATED.3IONS-SCNC: 9 MMOL/L (ref 5–18)
BUN SERPL-MCNC: 19 MG/DL (ref 8–28)
CALCIUM SERPL-MCNC: 8 MG/DL (ref 8.5–10.5)
CHLORIDE BLD-SCNC: 106 MMOL/L (ref 98–107)
CO2 SERPL-SCNC: 23 MMOL/L (ref 22–31)
CREAT SERPL-MCNC: 1.38 MG/DL (ref 0.6–1.1)
GFR SERPL CREATININE-BSD FRML MDRD: 35 ML/MIN/1.73M2
GLUCOSE BLD-MCNC: 77 MG/DL (ref 70–125)
POTASSIUM BLD-SCNC: 4.1 MMOL/L (ref 3.5–5)
SODIUM SERPL-SCNC: 138 MMOL/L (ref 136–145)

## 2020-02-17 ENCOUNTER — RECORDS - HEALTHEAST (OUTPATIENT)
Dept: LAB | Facility: CLINIC | Age: 85
End: 2020-02-17

## 2020-02-17 LAB
ANION GAP SERPL CALCULATED.3IONS-SCNC: 9 MMOL/L (ref 5–18)
BUN SERPL-MCNC: 22 MG/DL (ref 8–28)
CALCIUM SERPL-MCNC: 8.9 MG/DL (ref 8.5–10.5)
CHLORIDE BLD-SCNC: 106 MMOL/L (ref 98–107)
CO2 SERPL-SCNC: 20 MMOL/L (ref 22–31)
CREAT SERPL-MCNC: 1.57 MG/DL (ref 0.6–1.1)
GFR SERPL CREATININE-BSD FRML MDRD: 30 ML/MIN/1.73M2
GLUCOSE BLD-MCNC: 92 MG/DL (ref 70–125)
POTASSIUM BLD-SCNC: 4.7 MMOL/L (ref 3.5–5)
SODIUM SERPL-SCNC: 135 MMOL/L (ref 136–145)

## 2020-05-13 ENCOUNTER — RECORDS - HEALTHEAST (OUTPATIENT)
Dept: LAB | Facility: CLINIC | Age: 85
End: 2020-05-13

## 2020-05-13 LAB
ALBUMIN SERPL-MCNC: 3.3 G/DL (ref 3.5–5)
ALP SERPL-CCNC: 131 U/L (ref 45–120)
ALT SERPL W P-5'-P-CCNC: 11 U/L (ref 0–45)
ANION GAP SERPL CALCULATED.3IONS-SCNC: 8 MMOL/L (ref 5–18)
AST SERPL W P-5'-P-CCNC: 20 U/L (ref 0–40)
BASOPHILS # BLD AUTO: 0.1 THOU/UL (ref 0–0.2)
BASOPHILS NFR BLD AUTO: 1 % (ref 0–2)
BILIRUB SERPL-MCNC: 0.3 MG/DL (ref 0–1)
BUN SERPL-MCNC: 20 MG/DL (ref 8–28)
CALCIUM SERPL-MCNC: 9.1 MG/DL (ref 8.5–10.5)
CHLORIDE BLD-SCNC: 112 MMOL/L (ref 98–107)
CO2 SERPL-SCNC: 20 MMOL/L (ref 22–31)
CREAT SERPL-MCNC: 1.18 MG/DL (ref 0.6–1.1)
EOSINOPHIL # BLD AUTO: 0.2 THOU/UL (ref 0–0.4)
EOSINOPHIL NFR BLD AUTO: 3 % (ref 0–6)
ERYTHROCYTE [DISTWIDTH] IN BLOOD BY AUTOMATED COUNT: 14.9 % (ref 11–14.5)
GFR SERPL CREATININE-BSD FRML MDRD: 42 ML/MIN/1.73M2
GLUCOSE BLD-MCNC: 93 MG/DL (ref 70–125)
HCT VFR BLD AUTO: 37.2 % (ref 35–47)
HGB BLD-MCNC: 11.6 G/DL (ref 12–16)
LYMPHOCYTES # BLD AUTO: 2.5 THOU/UL (ref 0.8–4.4)
LYMPHOCYTES NFR BLD AUTO: 41 % (ref 20–40)
MCH RBC QN AUTO: 31.3 PG (ref 27–34)
MCHC RBC AUTO-ENTMCNC: 31.2 G/DL (ref 32–36)
MCV RBC AUTO: 100 FL (ref 80–100)
MONOCYTES # BLD AUTO: 0.3 THOU/UL (ref 0–0.9)
MONOCYTES NFR BLD AUTO: 6 % (ref 2–10)
NEUTROPHILS # BLD AUTO: 3 THOU/UL (ref 2–7.7)
NEUTROPHILS NFR BLD AUTO: 50 % (ref 50–70)
PLATELET # BLD AUTO: 202 THOU/UL (ref 140–440)
PMV BLD AUTO: 10.4 FL (ref 8.5–12.5)
POTASSIUM BLD-SCNC: 4.2 MMOL/L (ref 3.5–5)
PROT SERPL-MCNC: 6.8 G/DL (ref 6–8)
PTH-INTACT SERPL-MCNC: 147 PG/ML (ref 10–86)
RBC # BLD AUTO: 3.71 MILL/UL (ref 3.8–5.4)
SODIUM SERPL-SCNC: 140 MMOL/L (ref 136–145)
TSH SERPL DL<=0.005 MIU/L-ACNC: 1.93 UIU/ML (ref 0.3–5)
WBC: 6 THOU/UL (ref 4–11)

## 2020-05-15 LAB — 25(OH)D3 SERPL-MCNC: 24.4 NG/ML (ref 30–80)

## 2020-09-09 ENCOUNTER — RECORDS - HEALTHEAST (OUTPATIENT)
Dept: LAB | Facility: CLINIC | Age: 85
End: 2020-09-09

## 2020-09-13 LAB
SARS-COV-2 BY NAA - HISTORICAL: NOT DETECTED
SARS-COV-2 SOURCE - HISTORICAL: NORMAL

## 2020-11-02 ENCOUNTER — RECORDS - HEALTHEAST (OUTPATIENT)
Dept: LAB | Facility: CLINIC | Age: 85
End: 2020-11-02

## 2020-11-03 LAB
ALBUMIN SERPL-MCNC: 3.5 G/DL (ref 3.5–5)
ALP SERPL-CCNC: 115 U/L (ref 45–120)
ALT SERPL W P-5'-P-CCNC: <9 U/L (ref 0–45)
ANION GAP SERPL CALCULATED.3IONS-SCNC: 6 MMOL/L (ref 5–18)
AST SERPL W P-5'-P-CCNC: 22 U/L (ref 0–40)
BASOPHILS # BLD AUTO: 0.1 THOU/UL (ref 0–0.2)
BASOPHILS NFR BLD AUTO: 1 % (ref 0–2)
BILIRUB SERPL-MCNC: 0.4 MG/DL (ref 0–1)
BUN SERPL-MCNC: 19 MG/DL (ref 8–28)
CALCIUM SERPL-MCNC: 9.2 MG/DL (ref 8.5–10.5)
CHLORIDE BLD-SCNC: 107 MMOL/L (ref 98–107)
CO2 SERPL-SCNC: 25 MMOL/L (ref 22–31)
CREAT SERPL-MCNC: 1.06 MG/DL (ref 0.6–1.1)
EOSINOPHIL # BLD AUTO: 0.2 THOU/UL (ref 0–0.4)
EOSINOPHIL NFR BLD AUTO: 4 % (ref 0–6)
ERYTHROCYTE [DISTWIDTH] IN BLOOD BY AUTOMATED COUNT: 13.5 % (ref 11–14.5)
GFR SERPL CREATININE-BSD FRML MDRD: 48 ML/MIN/1.73M2
GLUCOSE BLD-MCNC: 108 MG/DL (ref 70–125)
HCT VFR BLD AUTO: 34.1 % (ref 35–47)
HGB BLD-MCNC: 10.8 G/DL (ref 12–16)
IMM GRANULOCYTES # BLD: 0 THOU/UL
IMM GRANULOCYTES NFR BLD: 0 %
LYMPHOCYTES # BLD AUTO: 1.7 THOU/UL (ref 0.8–4.4)
LYMPHOCYTES NFR BLD AUTO: 38 % (ref 20–40)
MCH RBC QN AUTO: 31.8 PG (ref 27–34)
MCHC RBC AUTO-ENTMCNC: 31.7 G/DL (ref 32–36)
MCV RBC AUTO: 100 FL (ref 80–100)
MONOCYTES # BLD AUTO: 0.3 THOU/UL (ref 0–0.9)
MONOCYTES NFR BLD AUTO: 6 % (ref 2–10)
NEUTROPHILS # BLD AUTO: 2.3 THOU/UL (ref 2–7.7)
NEUTROPHILS NFR BLD AUTO: 51 % (ref 50–70)
PLATELET # BLD AUTO: 207 THOU/UL (ref 140–440)
PMV BLD AUTO: 9.9 FL (ref 8.5–12.5)
POTASSIUM BLD-SCNC: 4.7 MMOL/L (ref 3.5–5)
PROT SERPL-MCNC: 6.5 G/DL (ref 6–8)
PTH-INTACT SERPL-MCNC: 137 PG/ML (ref 10–86)
RBC # BLD AUTO: 3.4 MILL/UL (ref 3.8–5.4)
SODIUM SERPL-SCNC: 138 MMOL/L (ref 136–145)
TSH SERPL DL<=0.005 MIU/L-ACNC: 1.91 UIU/ML (ref 0.3–5)
WBC: 4.5 THOU/UL (ref 4–11)

## 2020-11-04 LAB — 25(OH)D3 SERPL-MCNC: 30.7 NG/ML (ref 30–80)

## 2020-11-14 ENCOUNTER — RECORDS - HEALTHEAST (OUTPATIENT)
Dept: LAB | Facility: CLINIC | Age: 85
End: 2020-11-14

## 2020-11-14 LAB
ALBUMIN UR-MCNC: ABNORMAL MG/DL
APPEARANCE UR: ABNORMAL
BACTERIA #/AREA URNS HPF: ABNORMAL HPF
BILIRUB UR QL STRIP: NEGATIVE
COLOR UR AUTO: YELLOW
GLUCOSE UR STRIP-MCNC: NEGATIVE MG/DL
HGB UR QL STRIP: ABNORMAL
KETONES UR STRIP-MCNC: NEGATIVE MG/DL
LEUKOCYTE ESTERASE UR QL STRIP: ABNORMAL
MUCOUS THREADS #/AREA URNS LPF: ABNORMAL LPF
NITRATE UR QL: NEGATIVE
PH UR STRIP: 7 [PH] (ref 4.5–8)
RBC #/AREA URNS AUTO: ABNORMAL HPF
SP GR UR STRIP: 1.02 (ref 1–1.03)
SQUAMOUS #/AREA URNS AUTO: >100 LPF
UROBILINOGEN UR STRIP-ACNC: ABNORMAL
WBC #/AREA URNS AUTO: >100 HPF
WBC CLUMPS #/AREA URNS HPF: PRESENT /[HPF]

## 2020-11-16 LAB — BACTERIA SPEC CULT: NORMAL

## 2021-03-19 ENCOUNTER — RECORDS - HEALTHEAST (OUTPATIENT)
Dept: LAB | Facility: CLINIC | Age: 86
End: 2021-03-19

## 2021-03-19 LAB
SARS-COV-2 PCR COMMENT: NORMAL
SARS-COV-2 RNA SPEC QL NAA+PROBE: NEGATIVE
SARS-COV-2 VIRUS SPECIMEN SOURCE: NORMAL

## 2021-04-07 ENCOUNTER — RECORDS - HEALTHEAST (OUTPATIENT)
Dept: LAB | Facility: CLINIC | Age: 86
End: 2021-04-07

## 2021-04-16 ENCOUNTER — RECORDS - HEALTHEAST (OUTPATIENT)
Dept: LAB | Facility: CLINIC | Age: 86
End: 2021-04-16

## 2021-05-24 ENCOUNTER — RECORDS - HEALTHEAST (OUTPATIENT)
Dept: LAB | Facility: CLINIC | Age: 86
End: 2021-05-24

## 2021-05-25 LAB
ALBUMIN SERPL-MCNC: 3.1 G/DL (ref 3.5–5)
ALP SERPL-CCNC: 117 U/L (ref 45–120)
ALT SERPL W P-5'-P-CCNC: <9 U/L (ref 0–45)
ANION GAP SERPL CALCULATED.3IONS-SCNC: 11 MMOL/L (ref 5–18)
AST SERPL W P-5'-P-CCNC: 19 U/L (ref 0–40)
BILIRUB SERPL-MCNC: 0.3 MG/DL (ref 0–1)
BUN SERPL-MCNC: 17 MG/DL (ref 8–28)
CALCIUM SERPL-MCNC: 8.8 MG/DL (ref 8.5–10.5)
CHLORIDE BLD-SCNC: 109 MMOL/L (ref 98–107)
CO2 SERPL-SCNC: 21 MMOL/L (ref 22–31)
CREAT SERPL-MCNC: 1.31 MG/DL (ref 0.6–1.1)
ERYTHROCYTE [DISTWIDTH] IN BLOOD BY AUTOMATED COUNT: 13.2 % (ref 11–14.5)
GFR SERPL CREATININE-BSD FRML MDRD: 37 ML/MIN/1.73M2
GLUCOSE BLD-MCNC: 138 MG/DL (ref 70–125)
HCT VFR BLD AUTO: 33.6 % (ref 35–47)
HGB BLD-MCNC: 10.4 G/DL (ref 12–16)
MCH RBC QN AUTO: 31.3 PG (ref 27–34)
MCHC RBC AUTO-ENTMCNC: 31 G/DL (ref 32–36)
MCV RBC AUTO: 101 FL (ref 80–100)
PLATELET # BLD AUTO: 183 THOU/UL (ref 140–440)
PMV BLD AUTO: 9.7 FL (ref 8.5–12.5)
POTASSIUM BLD-SCNC: 4.4 MMOL/L (ref 3.5–5)
PROT SERPL-MCNC: 6.2 G/DL (ref 6–8)
PTH-INTACT SERPL-MCNC: 135 PG/ML (ref 10–86)
RBC # BLD AUTO: 3.32 MILL/UL (ref 3.8–5.4)
SODIUM SERPL-SCNC: 141 MMOL/L (ref 136–145)
WBC: 5.2 THOU/UL (ref 4–11)

## 2021-05-26 LAB — 25(OH)D3 SERPL-MCNC: 34.1 NG/ML (ref 30–80)

## 2021-06-10 ENCOUNTER — RECORDS - HEALTHEAST (OUTPATIENT)
Dept: LAB | Facility: CLINIC | Age: 86
End: 2021-06-10

## 2021-07-08 ENCOUNTER — RECORDS - HEALTHEAST (OUTPATIENT)
Dept: LAB | Facility: CLINIC | Age: 86
End: 2021-07-08

## 2021-07-08 LAB
ALBUMIN UR-MCNC: ABNORMAL G/DL
APPEARANCE UR: ABNORMAL
BACTERIA #/AREA URNS HPF: ABNORMAL /[HPF]
BILIRUB UR QL STRIP: NEGATIVE
COLOR UR AUTO: YELLOW
GLUCOSE UR STRIP-MCNC: NEGATIVE MG/DL
HGB UR QL STRIP: ABNORMAL
KETONES UR STRIP-MCNC: ABNORMAL MG/DL
LEUKOCYTE ESTERASE UR QL STRIP: ABNORMAL
NITRATE UR QL: NEGATIVE
PH UR STRIP: 6.5 [PH] (ref 5–8)
RBC URINE: 85 HPF
SP GR UR STRIP: 1.01 (ref 1–1.03)
SQUAMOUS EPITHELIAL: 16 /HPF
UROBILINOGEN UR STRIP-ACNC: ABNORMAL
WBC CLUMPS #/AREA URNS HPF: PRESENT /[HPF]
WBC URINE: >182 HPF

## 2021-07-11 LAB
BACTERIA SPEC CULT: ABNORMAL
BACTERIA SPEC CULT: ABNORMAL